# Patient Record
Sex: MALE | Race: WHITE | NOT HISPANIC OR LATINO | Employment: FULL TIME | URBAN - NONMETROPOLITAN AREA
[De-identification: names, ages, dates, MRNs, and addresses within clinical notes are randomized per-mention and may not be internally consistent; named-entity substitution may affect disease eponyms.]

---

## 2020-02-10 ENCOUNTER — OFFICE VISIT (OUTPATIENT)
Dept: FAMILY MEDICINE | Facility: OTHER | Age: 30
End: 2020-02-10
Attending: FAMILY MEDICINE
Payer: COMMERCIAL

## 2020-02-10 VITALS
SYSTOLIC BLOOD PRESSURE: 138 MMHG | TEMPERATURE: 98.1 F | DIASTOLIC BLOOD PRESSURE: 86 MMHG | BODY MASS INDEX: 31.29 KG/M2 | HEART RATE: 83 BPM | OXYGEN SATURATION: 97 % | WEIGHT: 231 LBS | HEIGHT: 72 IN

## 2020-02-10 DIAGNOSIS — F17.200 TOBACCO USE DISORDER: ICD-10-CM

## 2020-02-10 DIAGNOSIS — J32.9 CHRONIC CONGESTION OF PARANASAL SINUS: Primary | ICD-10-CM

## 2020-02-10 DIAGNOSIS — Z71.6 ENCOUNTER FOR TOBACCO USE CESSATION COUNSELING: ICD-10-CM

## 2020-02-10 PROCEDURE — 99213 OFFICE O/P EST LOW 20 MIN: CPT | Performed by: FAMILY MEDICINE

## 2020-02-10 RX ORDER — FLUTICASONE PROPIONATE 50 MCG
1 SPRAY, SUSPENSION (ML) NASAL DAILY
Qty: 16 G | Refills: 1 | Status: SHIPPED | OUTPATIENT
Start: 2020-02-10 | End: 2022-03-15

## 2020-02-10 ASSESSMENT — PAIN SCALES - GENERAL: PAINLEVEL: NO PAIN (1)

## 2020-02-10 ASSESSMENT — MIFFLIN-ST. JEOR: SCORE: 2040.81

## 2020-02-10 NOTE — PATIENT INSTRUCTIONS
Smoking cessation advised.  Saline sinus rinses - neti pot.  Restart nasal steroid spray - use daily - takes time to take effect.  CT sinuses ordered.  ENT referral.

## 2020-02-10 NOTE — NURSING NOTE
Chief Complaint   Patient presents with     Sinus Problem       Initial /86 (BP Location: Left arm, Patient Position: Sitting, Cuff Size: Adult Regular)   Pulse 83   Temp 98.1  F (36.7  C) (Tympanic)   Ht 1.829 m (6')   Wt 104.8 kg (231 lb)   SpO2 97%   BMI 31.33 kg/m   Estimated body mass index is 31.33 kg/m  as calculated from the following:    Height as of this encounter: 1.829 m (6').    Weight as of this encounter: 104.8 kg (231 lb).  Medication Reconciliation: complete  Agnes Lawson LPN

## 2020-02-10 NOTE — PROGRESS NOTES
Subjective       Sonido Stevens is a 29 year old male who presents to clinic today for the following health issues:    HPI   RESPIRATORY SYMPTOMS-sinuses      Duration: 5 years    Description  nasal congestion, rhinorrhea, facial pain/pressure and headache - mostly right sided    Severity: moderate    Accompanying signs and symptoms: right nostril constantly plugged    History (predisposing factors):  tobacco abuse 1/2 pack per day     Precipitating or alleviating factors: None    Therapies tried and outcome:  nasal spray/wash -noneffective- aleve D was working but not as effective anymore    No antibiotic use recently    Last seen here 2016    Aleve D OTC - takes most days    No ENT/sinus surgery    More right sided sinus infections    Does get dental pain right upper    No fevers    No ear symptoms    No ST or voice changes or post nasal drip    No wheeze    No childhood asthma/eczema/allergies    Uncle and grandpa with sinus surgery    Prior Waltin-D; month of nasonex; Afrin occasionally    No prior allergy testing    No prior sinus imaging    No seasonal component    Works outside    Prior saline rinses        Current Outpatient Medications   Medication     fluticasone (FLONASE) 50 MCG/ACT nasal spray     UNABLE TO FIND     No current facility-administered medications for this visit.        Patient Active Problem List   Diagnosis     ACP (advance care planning)     Past Surgical History:   Procedure Laterality Date     wisdom teeth  2014       Social History     Tobacco Use     Smoking status: Current Every Day Smoker     Packs/day: 0.50     Types: Cigarettes     Smokeless tobacco: Former User     Tobacco comment: info given on [facility] classes, declined call it quits   Substance Use Topics     Alcohol use: Yes     Alcohol/week: 0.0 standard drinks     Family History   Problem Relation Age of Onset     Diabetes No family hx of      Hypertension No family hx of            Reviewed and updated as needed this visit by  Provider  Tobacco  Allergies  Meds  Med Hx  Surg Hx  Fam Hx  Soc Hx        Review of Systems   ROS COMP: Constitutional, HEENT, cardiovascular, pulmonary, gi and gu systems are negative, except as otherwise noted.      Objective    /86 (BP Location: Left arm, Patient Position: Sitting, Cuff Size: Adult Regular)   Pulse 83   Temp 98.1  F (36.7  C) (Tympanic)   Ht 1.829 m (6')   Wt 104.8 kg (231 lb)   SpO2 97%   BMI 31.33 kg/m    Body mass index is 31.33 kg/m .  Physical Exam   GENERAL: healthy, alert and no distress  EYES: Eyes grossly normal to inspection, PERRL and conjunctivae and sclerae normal  HENT: right ear: normal: no effusions, no erythema, normal landmarks, left ear: clear effusion, nasal mucosa edematous , rhinorrhea yellow, green and most noted on the right, oropharynx clear, oral mucous membranes moist and sinuses: maxillary, frontal tenderness on right  NECK: no adenopathy, no asymmetry, masses, or scars and thyroid normal to palpation  RESP: lungs clear to auscultation - no rales, rhonchi or wheezes  CV: regular rate and rhythm, normal S1 S2, no S3 or S4, no murmur, click or rub, no peripheral edema and peripheral pulses strong  PSYCH: mentation appears normal, affect normal/bright    Diagnostic Test Results:  none         Assessment & Plan       ICD-10-CM    1. Chronic congestion of paranasal sinus J32.9 CT Sinus w/o Contrast     OTOLARYNGOLOGY REFERRAL     fluticasone (FLONASE) 50 MCG/ACT nasal spray   2. Tobacco use disorder F17.200    3. Encounter for tobacco use cessation counseling Z71.6         Tobacco Cessation:   reports that he has been smoking cigarettes. He has been smoking about 0.50 packs per day. He has quit using smokeless tobacco.  Tobacco Cessation Action Plan: Information offered: Patient not interested at this time      BMI:   Estimated body mass index is 31.33 kg/m  as calculated from the following:    Height as of this encounter: 1.829 m (6').    Weight as of  this encounter: 104.8 kg (231 lb).     Given duration, and unilateral symptoms, concern for a structural cause.  Has tried antihistamines, nasal sprays (not very long), decongestants.  Evaluate structure with CT.  ENT consult as well.  Discussed tobacco use as it related to chronic sinus symptoms, recurrent infections.    Patient Instructions   Smoking cessation advised.  Saline sinus rinses - neti pot.  Restart nasal steroid spray - use daily - takes time to take effect.  CT sinuses ordered.  ENT referral.        No follow-ups on file.    Africa Bass MD  [facility] [place]

## 2020-02-18 ENCOUNTER — HOSPITAL ENCOUNTER (OUTPATIENT)
Dept: CT IMAGING | Facility: HOSPITAL | Age: 30
Discharge: HOME | End: 2020-02-18
Attending: FAMILY MEDICINE | Admitting: FAMILY MEDICINE
Payer: COMMERCIAL

## 2020-02-18 DIAGNOSIS — J32.9 CHRONIC CONGESTION OF PARANASAL SINUS: ICD-10-CM

## 2020-02-18 PROCEDURE — 70486 CT MAXILLOFACIAL W/O DYE: CPT | Mod: TC

## 2020-03-02 ENCOUNTER — HEALTH MAINTENANCE LETTER (OUTPATIENT)
Age: 30
End: 2020-03-02

## 2020-03-19 ENCOUNTER — OFFICE VISIT (OUTPATIENT)
Dept: OTOLARYNGOLOGY | Facility: OTHER | Age: 30
End: 2020-03-19
Attending: PHYSICIAN ASSISTANT
Payer: COMMERCIAL

## 2020-03-19 VITALS — WEIGHT: 230 LBS | HEIGHT: 72 IN | BODY MASS INDEX: 31.15 KG/M2

## 2020-03-19 DIAGNOSIS — J32.4 CHRONIC PANSINUSITIS: Primary | ICD-10-CM

## 2020-03-19 DIAGNOSIS — J34.3 NASAL TURBINATE HYPERTROPHY: ICD-10-CM

## 2020-03-19 DIAGNOSIS — J34.2 DNS (DEVIATED NASAL SEPTUM): ICD-10-CM

## 2020-03-19 DIAGNOSIS — R09.81 CONGESTION OF PARANASAL SINUS: ICD-10-CM

## 2020-03-19 DIAGNOSIS — J31.0 CHRONIC RHINITIS: ICD-10-CM

## 2020-03-19 PROCEDURE — 99213 OFFICE O/P EST LOW 20 MIN: CPT | Mod: 95 | Performed by: PHYSICIAN ASSISTANT

## 2020-03-19 RX ORDER — FLUTICASONE PROPIONATE 50 MCG
2 SPRAY, SUSPENSION (ML) NASAL DAILY
Qty: 16 G | Refills: 11 | Status: SHIPPED | OUTPATIENT
Start: 2020-03-19 | End: 2022-03-15

## 2020-03-19 RX ORDER — BUDESONIDE 0.5 MG/2ML
0.5 INHALANT ORAL 2 TIMES DAILY
Qty: 2 BOX | Refills: 1 | Status: SHIPPED | OUTPATIENT
Start: 2020-03-19 | End: 2022-03-15

## 2020-03-19 RX ORDER — CETIRIZINE HYDROCHLORIDE, PSEUDOEPHEDRINE HYDROCHLORIDE 5; 120 MG/1; MG/1
1 TABLET, FILM COATED, EXTENDED RELEASE ORAL 2 TIMES DAILY
Qty: 60 TABLET | Refills: 3 | Status: SHIPPED | OUTPATIENT
Start: 2020-03-19 | End: 2022-03-15

## 2020-03-19 ASSESSMENT — MIFFLIN-ST. JEOR: SCORE: 2036.27

## 2020-03-19 ASSESSMENT — PAIN SCALES - GENERAL: PAINLEVEL: NO PAIN (0)

## 2020-03-19 NOTE — PROGRESS NOTES
"Sonido Stevens is a 29 year old male who is being evaluated via a billable telephone visit.      The patient has been notified of following:     \"This telephone visit will be conducted via a call between you and your physician/provider. We have found that certain health care needs can be provided without the need for a physical exam.  This service lets us provide the care you need with a short phone conversation.  If a prescription is necessary we can send it directly to your pharmacy.  If lab work is needed we can place an order for that and you can then stop by our lab to have the test done at a later time.    If during the course of the call the physician/provider feels a telephone visit is not appropriate, you will not be charged for this service.\"     Sonido Stevens complains of    Chief Complaint   Patient presents with     Sinus Problem     Pt has been referred by Dr. Ritter for chronic congestion of the paranasal sinus.  Pt had a CT sinus.     Patient has noticed ongoing concerns for the last 5 years, progressively worsening throughout the years. He has been using Aleve D w/o much relief. He was recently seen by his Primary physician, Dr Ritter due to ongoing issues. Chronic facial issues, right nasal obstruction. He was started on Flonase and ordered a Sinus CT.   No recent abx use.     He has neti pot, but not used on a regular basis.   He has ongoing issues on right maxillary, ethmoid. Frontal pain.   He has right upper jaw pain as well.   He has some allergy symptoms but has not felt symptoms.   He has Flonase and using with some relief.   He does feel restricted on his right side.     Allergy:  Resides in an apartments.   There is no water or mold  Carpet in bedroom - Yes   Heat in home- Baseboard heat  Animals- one cat   Family hx of allergies- Uncle- sinus issues.   Past trials of medications Decongestants and one month of Flonase.   Denies COM. No otologic surgeries.   No prior nasal trauma or " surgery.     Reviewed Sinus CT    EXAM:CT SINUS W/O CONTRAST      CLINICAL HISTORY: Patient Age  29 years Additional clinical info:  Sinusitis, chronic, possible surgery; Chronic congestion of paranasal  sinus     COMPARISON: None       TECHNIQUE: Axial images acquired without contrast with coronal and  sagittal two-dimensional reformatted images     CONTRAST: None     FINDINGS: Near complete opacification of the bilateral maxillary  sinuses and ethmoid sinuses. Sphenoid and frontal sinuses are  completely opacified and filled with fluid or soft tissue. There are a  few pockets of gas ethmoid sinuses and in the center of the right  maxillary antrum. Bone thickening about the sphenoid sinuses  consistent with chronic inflammatory change. Areas of bone erosion of  the ethmoid sinuses Bilateral infundibula are obstructed. Nasal septum  is slightly deviated to the left anteriorly. The mastoid air cells  appear normal                                                                           IMPRESSION:   1. Severe diffuse paranasal sinusitis with near complete opacification  of all of the paranasal sinuses and chronic appearing bone thickening  surrounding the sphenoid sinuses.  2. Slight deviation of the anterior nasal septum to the left.     I have reviewed and updated the patient's Past Medical History, Social History, Family History and Medication List.    ALLERGIES  Patient has no known allergies.    Additional provider notes: Reviewed above and Sinus CT imaging was reviewed as noted above.       Assessment/Plan:  1. Chronic pansinusitis  2. DNS (deviated nasal septum)  3. Nasal turbinate hypertrophy  4. Congestion of paranasal sinus  5. Chronic rhinitis    - budesonide (PULMICORT) 0.5 MG/2ML neb solution; Spray 2 mLs (0.5 mg) in nostril 2 times daily Make 240 cc Francesco med sinus irrigation Mix 2 ml vial of budesonide 0.5 mg Rinse BID for 4 weeks  Dispense: 2 Box; Refill: 1  - fluticasone (FLONASE) 50 MCG/ACT nasal  spray; Spray 2 sprays into both nostrils daily  Dispense: 16 g; Refill: 11  - amoxicillin-clavulanate (AUGMENTIN) 875-125 MG tablet; Take 1 tablet by mouth 2 times daily for 10 days  Dispense: 20 tablet; Refill: 0  - cetirizine-pseudoePHEDrine ER (ZYRTEC-D) 5-120 MG 12 hr tablet; Take 1 tablet by mouth 2 times daily  Dispense: 60 tablet; Refill: 3    Patient will start Budesonide BID for 4+ weeks  Continue with Flonase at this time.   Augmentin for 10 days sent in. Start oral probiotic.   Zyrtec D 1-2 times daily or PRN use.     Reviewed imaging and ongoing issues, will return to ENT for repeat exam with rigid endoscopy. Discussed options of completing sinus surgery with patient.   Follow up arranged with Dr. Evans to complete above.   He was informed regarding possible allergy testing, however, his symptoms are year round without significant seasonal complaints.     He agrees with this plan.         Budesonide nasal saline irrigation per instructions:  -Obtain Francesco Med Sinus rinse over the counter.    -Use warm distilled water and 2 packets of the salt solution that comes with the bottle, dissolve in bottle up to the 240 mL jaylon.  -Add 1 vial of budesonide.  -Irrigate each side of your nose leaning over the sink, using 1/3 to 1/2 the volume of the bottle in each nostril every irrigation.  Irrigate 2 times daily.  -If additional rinses are needed/recommended, you may use the plan Francesco Med Sinus irrigation without the use of added budesonide    Phone call duration: 20 minutes    Dominique Drake PA-C  ENT  [facility], [place]  [phone]

## 2020-03-19 NOTE — PATIENT INSTRUCTIONS
Start Budesonide sinus rinses.   Rinse as directed twice a day, until follow up.   Continue with Flonase daily.   Start Zyrtec D. Use 1-2 times daily or as needed  Start Augmentin twice a day for 10 days. Take probiotic with.     Follow up with Dr. Evans for nasal exam and consideration of sinus surgery.       Thank you for allowing Dominique Drake PA-C and our ENT team to participate in your care.  If your medications are too expensive, please give the nurse a call.  We can possibly change this medication.  If you have a scheduling or an appointment question please contact our Health Unit Coordinator at their direct line [phone].   ALL nursing questions or concerns can be directed to your ENT nurse at: [phone] [place]    Budesonide nasal saline irrigation per instructions:  -Obtain Francesco Med Sinus rinse over the counter.    -Use warm distilled water and 2 packets of the salt solution that comes with the bottle, dissolve in bottle up to the 240 mL jaylon.  -Add 1 vial of budesonide.  -Irrigate each side of your nose leaning over the sink, using 1/3 to 1/2 the volume of the bottle in each nostril every irrigation.  Irrigate 2 times daily.  -If additional rinses are needed/recommended, you may use the plan Francesco Med Sinus irrigation without the use of added budesonide

## 2020-03-19 NOTE — LETTER
"    3/19/2020         RE: Sonido Stevens  [address]  [place] [address]        Dear Colleague,    Thank you for referring your patient, Sonido Stevens, to the [facility]. Please see a copy of my visit note below.    Sonido Stevens is a 29 year old male who is being evaluated via a billable telephone visit.      The patient has been notified of following:     \"This telephone visit will be conducted via a call between you and your physician/provider. We have found that certain health care needs can be provided without the need for a physical exam.  This service lets us provide the care you need with a short phone conversation.  If a prescription is necessary we can send it directly to your pharmacy.  If lab work is needed we can place an order for that and you can then stop by our lab to have the test done at a later time.    If during the course of the call the physician/provider feels a telephone visit is not appropriate, you will not be charged for this service.\"     Sonido Stevens complains of    Chief Complaint   Patient presents with     Sinus Problem     Pt has been referred by Dr. Ritter for chronic congestion of the paranasal sinus.  Pt had a CT sinus.     Patient has noticed ongoing concerns for the last 5 years, progressively worsening throughout the years. He has been using Aleve D w/o much relief. He was recently seen by his Primary physician, Dr Ritter due to ongoing issues. Chronic facial issues, right nasal obstruction. He was started on Flonase and ordered a Sinus CT.   No recent abx use.     He has neti pot, but not used on a regular basis.   He has ongoing issues on right maxillary, ethmoid. Frontal pain.   He has right upper jaw pain as well.   He has some allergy symptoms but has not felt symptoms.   He has Flonase and using with some relief.   He does feel restricted on his right side.     Allergy:  Resides in an apartments.   There is no water or mold  Carpet in " bedroom - Yes   Heat in home- Baseboard heat  Animals- one cat   Family hx of allergies- Uncle- sinus issues.   Past trials of medications Decongestants and one month of Flonase.   Denies COM. No otologic surgeries.   No prior nasal trauma or surgery.     Reviewed Sinus CT    EXAM:CT SINUS W/O CONTRAST      CLINICAL HISTORY: Patient Age  29 years Additional clinical info:  Sinusitis, chronic, possible surgery; Chronic congestion of paranasal  sinus     COMPARISON: None       TECHNIQUE: Axial images acquired without contrast with coronal and  sagittal two-dimensional reformatted images     CONTRAST: None     FINDINGS: Near complete opacification of the bilateral maxillary  sinuses and ethmoid sinuses. Sphenoid and frontal sinuses are  completely opacified and filled with fluid or soft tissue. There are a  few pockets of gas ethmoid sinuses and in the center of the right  maxillary antrum. Bone thickening about the sphenoid sinuses  consistent with chronic inflammatory change. Areas of bone erosion of  the ethmoid sinuses Bilateral infundibula are obstructed. Nasal septum  is slightly deviated to the left anteriorly. The mastoid air cells  appear normal                                                                           IMPRESSION:   1. Severe diffuse paranasal sinusitis with near complete opacification  of all of the paranasal sinuses and chronic appearing bone thickening  surrounding the sphenoid sinuses.  2. Slight deviation of the anterior nasal septum to the left.     I have reviewed and updated the patient's Past Medical History, Social History, Family History and Medication List.    ALLERGIES  Patient has no known allergies.    Additional provider notes: Reviewed above and Sinus CT imaging was reviewed as noted above.       Assessment/Plan:  1. Chronic pansinusitis  2. DNS (deviated nasal septum)  3. Nasal turbinate hypertrophy  4. Congestion of paranasal sinus  5. Chronic rhinitis    - budesonide  (PULMICORT) 0.5 MG/2ML neb solution; Spray 2 mLs (0.5 mg) in nostril 2 times daily Make 240 cc Francesco med sinus irrigation Mix 2 ml vial of budesonide 0.5 mg Rinse BID for 4 weeks  Dispense: 2 Box; Refill: 1  - fluticasone (FLONASE) 50 MCG/ACT nasal spray; Spray 2 sprays into both nostrils daily  Dispense: 16 g; Refill: 11  - amoxicillin-clavulanate (AUGMENTIN) 875-125 MG tablet; Take 1 tablet by mouth 2 times daily for 10 days  Dispense: 20 tablet; Refill: 0  - cetirizine-pseudoePHEDrine ER (ZYRTEC-D) 5-120 MG 12 hr tablet; Take 1 tablet by mouth 2 times daily  Dispense: 60 tablet; Refill: 3    Patient will start Budesonide BID for 4+ weeks  Continue with Flonase at this time.   Augmentin for 10 days sent in. Start oral probiotic.   Zyrtec D 1-2 times daily or PRN use.     Reviewed imaging and ongoing issues, will return to ENT for repeat exam with rigid endoscopy. Discussed options of completing sinus surgery with patient.   Follow up arranged with Dr. Evans to complete above.   He was informed regarding possible allergy testing, however, his symptoms are year round without significant seasonal complaints.     He agrees with this plan.         Budesonide nasal saline irrigation per instructions:  -Obtain Francesco Med Sinus rinse over the counter.    -Use warm distilled water and 2 packets of the salt solution that comes with the bottle, dissolve in bottle up to the 240 mL jaylon.  -Add 1 vial of budesonide.  -Irrigate each side of your nose leaning over the sink, using 1/3 to 1/2 the volume of the bottle in each nostril every irrigation.  Irrigate 2 times daily.  -If additional rinses are needed/recommended, you may use the plan Francesco Med Sinus irrigation without the use of added budesonide    Phone call duration: 20 minutes    Dominique Drake PA-C  ENT  [facility], [place]  [phone]      Again, thank you for allowing me to participate in the care of your patient.        Sincerely,        Dominique Drake PA-C

## 2020-08-05 ENCOUNTER — OFFICE VISIT (OUTPATIENT)
Dept: FAMILY MEDICINE | Facility: OTHER | Age: 30
End: 2020-08-05
Attending: FAMILY MEDICINE
Payer: COMMERCIAL

## 2020-08-05 DIAGNOSIS — R51.9 HEADACHE: Primary | ICD-10-CM

## 2020-08-05 DIAGNOSIS — R52 BODY ACHES: ICD-10-CM

## 2020-08-05 DIAGNOSIS — R53.83 FATIGUE: ICD-10-CM

## 2020-08-05 PROCEDURE — U0003 INFECTIOUS AGENT DETECTION BY NUCLEIC ACID (DNA OR RNA); SEVERE ACUTE RESPIRATORY SYNDROME CORONAVIRUS 2 (SARS-COV-2) (CORONAVIRUS DISEASE [COVID-19]), AMPLIFIED PROBE TECHNIQUE, MAKING USE OF HIGH THROUGHPUT TECHNOLOGIES AS DESCRIBED BY CMS-2020-01-R: HCPCS | Performed by: FAMILY MEDICINE

## 2020-08-06 LAB
SARS-COV-2 RNA SPEC QL NAA+PROBE: NOT DETECTED
SPECIMEN SOURCE: NORMAL

## 2020-12-20 ENCOUNTER — HEALTH MAINTENANCE LETTER (OUTPATIENT)
Age: 30
End: 2020-12-20

## 2021-04-18 ENCOUNTER — HEALTH MAINTENANCE LETTER (OUTPATIENT)
Age: 31
End: 2021-04-18

## 2021-08-17 ENCOUNTER — IMMUNIZATION (OUTPATIENT)
Dept: FAMILY MEDICINE | Facility: OTHER | Age: 31
End: 2021-08-17
Attending: FAMILY MEDICINE
Payer: COMMERCIAL

## 2021-08-17 PROCEDURE — 0001A PR COVID VAC PFIZER DIL RECON 30 MCG/0.3 ML IM: CPT

## 2021-08-17 PROCEDURE — 91300 PR COVID VAC PFIZER DIL RECON 30 MCG/0.3 ML IM: CPT

## 2021-09-08 ENCOUNTER — IMMUNIZATION (OUTPATIENT)
Dept: FAMILY MEDICINE | Facility: OTHER | Age: 31
End: 2021-09-08
Attending: FAMILY MEDICINE
Payer: COMMERCIAL

## 2021-09-08 PROCEDURE — 0002A PR COVID VAC PFIZER DIL RECON 30 MCG/0.3 ML IM: CPT

## 2021-09-08 PROCEDURE — 91300 PR COVID VAC PFIZER DIL RECON 30 MCG/0.3 ML IM: CPT

## 2021-10-03 ENCOUNTER — HEALTH MAINTENANCE LETTER (OUTPATIENT)
Age: 31
End: 2021-10-03

## 2022-01-20 ENCOUNTER — LAB (OUTPATIENT)
Dept: OCCUPATIONAL MEDICINE | Facility: OTHER | Age: 32
End: 2022-01-20

## 2022-01-20 PROCEDURE — C9803 HOPD COVID-19 SPEC COLLECT: HCPCS

## 2022-03-01 ENCOUNTER — ANCILLARY PROCEDURE (OUTPATIENT)
Dept: GENERAL RADIOLOGY | Facility: OTHER | Age: 32
End: 2022-03-01
Attending: NURSE PRACTITIONER
Payer: COMMERCIAL

## 2022-03-01 ENCOUNTER — OFFICE VISIT (OUTPATIENT)
Dept: FAMILY MEDICINE | Facility: OTHER | Age: 32
End: 2022-03-01
Attending: NURSE PRACTITIONER
Payer: COMMERCIAL

## 2022-03-01 VITALS
HEART RATE: 120 BPM | DIASTOLIC BLOOD PRESSURE: 88 MMHG | TEMPERATURE: 97.6 F | OXYGEN SATURATION: 97 % | SYSTOLIC BLOOD PRESSURE: 138 MMHG

## 2022-03-01 DIAGNOSIS — U09.9 POST-COVID CHRONIC COUGH: ICD-10-CM

## 2022-03-01 DIAGNOSIS — R05.3 POST-COVID CHRONIC COUGH: Primary | ICD-10-CM

## 2022-03-01 DIAGNOSIS — R05.3 POST-COVID CHRONIC COUGH: ICD-10-CM

## 2022-03-01 DIAGNOSIS — U09.9 POST-COVID CHRONIC COUGH: Primary | ICD-10-CM

## 2022-03-01 PROCEDURE — 99213 OFFICE O/P EST LOW 20 MIN: CPT | Performed by: NURSE PRACTITIONER

## 2022-03-01 PROCEDURE — 71046 X-RAY EXAM CHEST 2 VIEWS: CPT | Mod: TC | Performed by: RADIOLOGY

## 2022-03-01 RX ORDER — BENZONATATE 100 MG/1
100 CAPSULE ORAL 3 TIMES DAILY PRN
Qty: 20 CAPSULE | Refills: 0 | Status: SHIPPED | OUTPATIENT
Start: 2022-03-01 | End: 2022-03-15

## 2022-03-01 RX ORDER — ALBUTEROL SULFATE 90 UG/1
2 AEROSOL, METERED RESPIRATORY (INHALATION) EVERY 6 HOURS
Qty: 18 G | Refills: 0 | Status: SHIPPED | OUTPATIENT
Start: 2022-03-01

## 2022-03-01 ASSESSMENT — PAIN SCALES - GENERAL: PAINLEVEL: NO PAIN (0)

## 2022-03-01 NOTE — PATIENT INSTRUCTIONS
Patient Education     Viral Upper Respiratory Illness with Wheezing (Adult)    You have a viral upper respiratory illness (URI), which is another term for the common cold. When the infection causes a lot of irritation, the air passages can go into spasm. This causes wheezing and shortness of breath.  This illness is contagious during the first few days. It is spread through the air by coughing and sneezing. It may also be spread by direct contact. This could be by touching the sick person and then touching your own eyes, nose, or mouth. Frequent handwashing will decrease the risk.  Most viral illnesses go away within 7 to 10 days with rest and simple home remedies. Sometimes the illness may last for several weeks. Antibiotics will not kill a virus, and they are generally not prescribed for this condition.  Home care    If symptoms are severe, rest at home for the first 2 to 3 days. When you resume activity, don't let yourself get too tired.    If you smoke, stop. Ask your healthcare provider if you need help.    Stay away from secondhand cigarette smoke. Don't let people smoke in your house or car.    You may use acetaminophen or ibuprofen to control pain and fever, unless another medicine was prescribed. Take the medicine only as directed on the label. If you have chronic liver or kidney disease, have ever had a stomach ulcer or gastrointestinal bleeding, or are taking blood-thinning medicines, talk with your healthcare provider before using these medicines. Aspirin should never be given to anyone under 18 years of age who is ill with a viral infection or fever. It may cause severe liver or brain damage.    Your appetite may be poor, so a light diet is fine. Stay well hydrated by drinking 6 to 8 glasses of fluids per day (water, soft drinks, juices, tea, or soup). Extra fluids will help loosen secretions in the nose and lungs.    Over-the-counter cold medicines will not shorten the length of time you re sick, but  they may be helpful for the following symptoms: cough, sore throat, and nasal and sinus congestion. Ask your healthcare provider or pharmacist which over-the-counter medicine to use. Don't use decongestants if you have high blood pressure.  Follow-up care  Follow up with your healthcare provider, or as advised.  When to seek medical advice  Call your healthcare provider right away if any of these occur:    Cough with lots of colored sputum (mucus)    Severe headache; face, neck, or ear pain    Difficulty swallowing due to throat pain    Fever of 100.4 F (38 C) or higher, or as directed by your healthcare provider  Call 911  Call 911 if any of these occur:    Chest pain, shortness of breath, worsening wheezing, or difficulty breathing    Coughing up blood    Very severe pain when swallowing, especially if it goes along with a muffled voice  [employer] last reviewed this educational content on 6/1/2018 2000-2021 [employer]. All rights reserved. This information is not intended as a substitute for professional medical care. Always follow your healthcare professional's instructions.

## 2022-03-01 NOTE — PROGRESS NOTES
Assessment & Plan     Post-COVID chronic cough  Wheezing and harsh dry cough   Reviewed XR no signs of post covid pneumonia  Discussed self care for cough control with hydration and honey  - XR CHEST 2 VW (Clinic Performed); Future  - albuterol (PROAIR HFA/PROVENTIL HFA/VENTOLIN HFA) 108 (90 Base) MCG/ACT inhaler; Inhale 2 puffs into the lungs every 6 hours  - benzonatate (TESSALON) 100 MG capsule; Take 1 capsule (100 mg) by mouth 3 times daily as needed for cough             Tobacco Cessation:   reports that he has been smoking cigarettes. He has been smoking about 0.50 packs per day. He has quit using smokeless tobacco.      See Patient Instructions    No follow-ups on file.    DORA Nina [facility] - GONZALEZ Head is a 31 year old who presents for the following health issues     HPI     Acute Illness  Acute illness concerns: cough  Onset/Duration: 1 month  Symptoms:  Fever: no  Chills/Sweats: no  Headache (location?): no  Sinus Pressure: yes  Conjunctivitis:  no  Ear Pain: no  Rhinorrhea: YES  Congestion: no  Sore Throat: no  Cough: YES-non-productive  Wheeze: no  Decreased Appetite: no  Nausea: no  Vomiting: no  Diarrhea: no  Dysuria/Freq.: no  Dysuria or Hematuria: no  Fatigue/Achiness: no  Sick/Strep Exposure: no  Therapies tried and outcome: None  Work tested - positive for COVID about a month ago.  Continues to have cough.    Robitussin, day quil and mucinex - night time is the worse for the cough       Review of Systems   CONSTITUTIONAL: NEGATIVE for fever, chills, change in weight  ENT/MOUTH: sinus congestion   RESP:chronic cough dry cough most of the time with some chest tightness  CV: NEGATIVE for chest pain, palpitations or peripheral edema  GI: NEGATIVE for nausea, abdominal pain, heartburn, or change in bowel habits  : negative for dysuria, hematuria, decreased urinary stream, erectile dysfunction  NEURO: headache from cough       Objective    BP  138/88   Pulse 120   Temp 97.6  F (36.4  C) (Tympanic)   SpO2 97%   There is no height or weight on file to calculate BMI.  Physical Exam   GENERAL: alert and no distress  NECK: no adenopathy, no asymmetry, masses, or scars and thyroid normal to palpation  RESP: wheezing throughout and harsh dry cough   CV: regular rate and rhythm, normal S1 S2, no S3 or S4, no murmur, click or rub, no peripheral edema and peripheral pulses strong  ABDOMEN: soft, nontender, no hepatosplenomegaly, no masses and bowel sounds normal  SKIN: no suspicious lesions or rashes  NEURO: Normal strength and tone, mentation intact and speech normal  PSYCH: mentation appears normal, affect normal/bright    Results for orders placed or performed in visit on 03/01/22   XR CHEST 2 VW (Clinic Performed)     Status: None    Narrative    PROCEDURE:  XR CHEST 2 VW    HISTORY:  Post-COVID chronic cough; Post-COVID chronic cough.     COMPARISON:  None.    FINDINGS:   The cardiac silhouette is normal in size. The pulmonary vasculature is  normal.  The lungs are clear. No pleural effusion or pneumothorax.      Impression    IMPRESSION:  No acute cardiopulmonary disease.      RAFAL CORDON MD         SYSTEM ID:  [id number]

## 2022-03-07 NOTE — PROGRESS NOTES
Assessment & Plan     Acute bronchitis with symptoms > 10 days  Continued and worsening symptoms since seen over a week ago.  Long discussion about post covid vs bacterial infection.  At this time with worsening symptoms plan to treat with steroid and antibiotic.  He can use Robitussin AC at night to calm cough.  He is not sleeping well due to cough.  Tessalon pearls did not help with cough.  He has tried multiple symptomatic treatments with worsening symptoms.  Discussed plan of care. If no improvement he should follow up later Friday or next Monday for chest XR and labs or if worsening symptoms to the ER, such as SOB, difficulty breathing or any concerns   Previous chest XR on 3/1/22 was normal   - predniSONE (DELTASONE) 20 MG tablet; Take 1 tablet (20 mg) by mouth 2 times daily for 5 days  - azithromycin (ZITHROMAX) 250 MG tablet; Take 2 tablets (500 mg) by mouth daily for 1 day, THEN 1 tablet (250 mg) daily for 4 days.  - guaiFENesin-codeine (ROBITUSSIN AC) 100-10 MG/5ML solution; Take 5-10 mLs by mouth every 4 hours as needed for cough       Tobacco Cessation:   reports that he has been smoking cigarettes. He has been smoking about 0.50 packs per day. He has quit using smokeless tobacco.      See Patient Instructions    No follow-ups on file.    DORA Nina [facility] - GONZALEZ Head is a 31 year old who presents for the following health issues {    HPI     Acute Illness  Acute illness concerns: chronic cough  Onset/Duration: over a month  Symptoms:  Fever: no  Chills/Sweats: no  Headache (location?): no  Sinus Pressure: YES  Conjunctivitis:  no  Ear Pain: YES: bilateral  Rhinorrhea: no  Congestion: no  Sore Throat: YES  Cough: YES  Wheeze: no  Decreased Appetite: YES  Nausea: YES  Vomiting: YES  Diarrhea: no  Dysuria/Freq.: no  Dysuria or Hematuria: no  Fatigue/Achiness: no  Sick/Strep Exposure: no  Therapies tried and outcome: shira barker was seen  in clinic about a week ago for post covid cough.  He has had this cough for over a month now.  Symptomatic treatment without any improvement. Cough continues and now is getting a horse voice.  Normal CXR on 3/1/22.  Inhaler and Tessalon attempted without any improvement.          Review of Systems   CONSTITUTIONAL: NEGATIVE for fever, chills, change in weight  INTEGUMENTARY/SKIN: NEGATIVE for worrisome rashes, moles or lesions  ENT/MOUTH: ear pain bilateral, hoarseness and sore throat  RESP:coungested cough   CV: NEGATIVE for chest pain, palpitations or peripheral edema  GI: upper abdomen pain when coughing   : negative for dysuria, hematuria, decreased urinary stream, erectile dysfunction  NEURO: NEGATIVE for weakness, dizziness or paresthesias      Objective    /88 (BP Location: Right arm)   Pulse 87   Temp 97.7  F (36.5  C) (Tympanic)   Wt 113.4 kg (250 lb)   SpO2 96%   BMI 33.91 kg/m    Body mass index is 33.91 kg/m .  Physical Exam   GENERAL: alert and fatigued  HENT: ear canals and TM's normal, nose and mouth without ulcers or lesions  NECK: no adenopathy, no asymmetry, masses, or scars and thyroid normal to palpation  RESP: expiratory wheezes throughout  CV: regular rate and rhythm, normal S1 S2, no S3 or S4, no murmur, click or rub, no peripheral edema and peripheral pulses strong  ABDOMEN: soft, nontender, no hepatosplenomegaly, no masses and bowel sounds normal  NEURO: sensory exam grossly normal, mentation intact and fatigued     Office Visit on 08/05/2020   Component Date Value Ref Range Status     COVID-19 Virus PCR to [facility] - So* 08/05/2020 Nasopharyngeal   Final     COVID-19 Virus PCR to [facility] - Re* 08/05/2020 Not Detected   Final    Comment: Collection of multiple specimens from the same patient may be necessary to   detect the virus. The possibility of a false negative should be considered if   the patient's recent exposure or clinical presentation suggests 2019 nCOV   infection and  diagnostic tests for other causes of illness are negative.   Repeat testing may be considered in this setting.  Viral RNA was extracted via a validated method and subsequently underwent   single step reverse transcriptase-real time polymerase chain reaction using   primers to the CDC specified N1,N2 gene targets of CoV2 and human RNP as an   internal control.  A negative result does not rule out the presence of real-time PCR inhibitors   in the specimen or COVID-19 RNA in concentrations below the limit of detection   of the assay. The possibility of a false negative should be considered if the   patients recent exposure or clinical presentation suggests COVID-19.   Additional testing or repeat testing requires consultation with the   laborator                           y.  Nasopharyngeal specimen is the preferred choice for swab-based SARS CoV2   testing. When collection of a nasopharyngeal swab is not possible the   following are acceptable alternatives:  an oropharyngeal (OP) specimen collected by a healthcare professional, or a   nasal mid-turbinate (NMT) swab collected by a healthcare professional or by   onsite self-collection (using a flocked tapered swab), or an anterior nares   specimen collected by a healthcare professional or by onsite self-collection   (using a round foam swab). (Centers for Disease Control)  Testing performed by [facility], Room [address], [address] [place] [address]. This test was developed and its   performance characteristics determined by the [facility]. It has not been cleared or approved by the FDA.  The laboratory is regulated under the Clinical Laboratory Improvement   Amendments of 1988 (CLIA-88) as qualified to perform high-complexity testin                           g.   This test is used for clinical purposes. It should not be regarded as   investigational or for research.

## 2022-03-08 ENCOUNTER — OFFICE VISIT (OUTPATIENT)
Dept: FAMILY MEDICINE | Facility: OTHER | Age: 32
End: 2022-03-08
Attending: NURSE PRACTITIONER
Payer: COMMERCIAL

## 2022-03-08 VITALS
OXYGEN SATURATION: 96 % | TEMPERATURE: 97.7 F | WEIGHT: 250 LBS | HEART RATE: 87 BPM | BODY MASS INDEX: 33.91 KG/M2 | SYSTOLIC BLOOD PRESSURE: 132 MMHG | DIASTOLIC BLOOD PRESSURE: 88 MMHG

## 2022-03-08 DIAGNOSIS — J20.9 ACUTE BRONCHITIS WITH SYMPTOMS > 10 DAYS: Primary | ICD-10-CM

## 2022-03-08 PROCEDURE — 99214 OFFICE O/P EST MOD 30 MIN: CPT | Performed by: NURSE PRACTITIONER

## 2022-03-08 RX ORDER — AZITHROMYCIN 250 MG/1
TABLET, FILM COATED ORAL
Qty: 6 TABLET | Refills: 0 | Status: SHIPPED | OUTPATIENT
Start: 2022-03-08 | End: 2022-03-15

## 2022-03-08 RX ORDER — CODEINE PHOSPHATE AND GUAIFENESIN 10; 100 MG/5ML; MG/5ML
1-2 SOLUTION ORAL EVERY 4 HOURS PRN
Qty: 118 ML | Refills: 0 | Status: SHIPPED | OUTPATIENT
Start: 2022-03-08 | End: 2022-03-15

## 2022-03-08 RX ORDER — PREDNISONE 20 MG/1
20 TABLET ORAL 2 TIMES DAILY
Qty: 10 TABLET | Refills: 0 | Status: SHIPPED | OUTPATIENT
Start: 2022-03-08 | End: 2022-03-15

## 2022-03-08 ASSESSMENT — PAIN SCALES - GENERAL: PAINLEVEL: MILD PAIN (3)

## 2022-03-08 NOTE — LETTER
March 8, 2022      Sonido Stevens  [address] [place] [address]        To Whom It May Concern:    Sonido Stevens  was seen on 3/8/2022.  Please excuse him for the next 2-3 days due to illness.        Sincerely,        Honey Curtis, DORA CNP

## 2022-03-08 NOTE — NURSING NOTE
Chief Complaint   Patient presents with     Cough       Initial BP (!) 138/100 (BP Location: Right arm, Patient Position: Chair, Cuff Size: Adult Large)   Pulse 87   Temp 97.7  F (36.5  C) (Tympanic)   Wt 113.4 kg (250 lb)   SpO2 96%   BMI 33.91 kg/m   Estimated body mass index is 33.91 kg/m  as calculated from the following:    Height as of 3/19/20: 1.829 m (6').    Weight as of this encounter: 113.4 kg (250 lb).  Medication Reconciliation: complete  Man Ochoa LPN

## 2022-03-08 NOTE — PATIENT INSTRUCTIONS

## 2022-03-14 NOTE — PROGRESS NOTES
Assessment & Plan     Post-COVID chronic cough  Continues to have severe cough since COVID.  He did have relief while taking prednisone.  Plan to try another taper.  He does have a history of smoking will add PFTs and referral to pulmonology for further evaluation and treatment.   Chest XR remains clear for signs of pneumonia   Reviewed labs - normal kidney and liver function  CBC elevated WBC and neutrophils - recent steroid use  Elevated eosinophils - should take a daily antihistamine   - XR CHEST 2 VW (Clinic Performed); Future  - CBC with platelets and differential; Future  - Comprehensive metabolic panel (BMP + Alb, Alk Phos, ALT, AST, Total. Bili, TP); Future  - CBC with platelets and differential  - Comprehensive metabolic panel (BMP + Alb, Alk Phos, ALT, AST, Total. Bili, TP)  - predniSONE (DELTASONE) 10 MG tablet; Take 3 tabs by mouth daily x 3 days, then 2 tabs daily x 3 days, then 1 tab daily x 3 days, then 1/2 tab daily x 3 days.  - Pulmonary Function Test; Future  - Adult Pulmonary Medicine Referral  - guaiFENesin-codeine (ROBITUSSIN AC) 100-10 MG/5ML solution; Take 5-10 mLs by mouth every 4 hours as needed for cough      I spent a total of 55 minutes on the day of the visit.   Time spent doing chart review, history and exam, documentation and further activities per the note       Tobacco Cessation:   reports that he has been smoking cigarettes. He has been smoking about 0.50 packs per day. He has quit using smokeless tobacco.  Encouraged     See Patient Instructions    No follow-ups on file.    DORA Nina [facility] - [place]ZALE[place] Head is a 31 year old who presents for the following health issues  accompanied by his alone.    HPI       Concern - follow up cough  Onset: January 2022  Description: cough  Intensity: severe  Progression of Symptoms:  worsening  Accompanying Signs & Symptoms: chest pain from coughing  Previous history of similar problem: since  january  Precipitating factors:        Worsened by: nothing  Alleviating factors:        Improved by: prednisone  Therapies tried and outcome: antibiotic and prednisone.  Came back 48 hours after treatment  Post COVID cough positive for COVID on 1/20/22  Tried tessalon and albuterol inhaler no improvement on 3/1/22 normal chest XR   He is using the albuterol inhaler every 3-6 hours maybe some relief not sure   Follow up again on 3/8/22 with worsening symptoms treatment for bronchitis with prednisone, azithromycin  And robitussin with codeine - symptoms came back 2 days after completing prednisone   Prednisone did help short term, but symptoms worse again after completed prednisone   Had to sleep sitting up again, coughing until he vomits at times  PFT- has not had done previously no previous lung disease smoker used to smoke 5-6 per day now not able too   Consider pulmonology or post covid clinic     Review of Systems   CONSTITUTIONAL: NEGATIVE for fever, chills, change in weight  INTEGUMENTARY/SKIN: NEGATIVE for worrisome rashes, moles or lesions  ENT/MOUTH: NEGATIVE for ear, mouth and throat problems  RESP:cough, chest pain/tightness across into back  CV: chest pain from coughing   GI: NEGATIVE for nausea, abdominal pain, heartburn, or change in bowel habits  : negative for dysuria, hematuria, decreased urinary stream, erectile dysfunction  MUSCULOSKELETAL: NEGATIVE for significant arthralgias or myalgia  NEURO: continue to be fatigued again since stopping prednisone       Objective    /88   Pulse 108   Temp 98  F (36.7  C) (Tympanic)   SpO2 97%   There is no height or weight on file to calculate BMI.  Physical Exam   GENERAL: alert and mild distress SOB after coughing and oxygen sats decrease to 92% after cough   RESP: decreased breath sounds throughout  CV: regular rate and rhythm, normal S1 S2, no S3 or S4, no murmur, click or rub, no peripheral edema and peripheral pulses strong  ABDOMEN: soft,  nontender, no hepatosplenomegaly, no masses and bowel sounds normal  SKIN: no suspicious lesions or rashes  NEURO: fatigued     Results for orders placed or performed in visit on 03/15/22   XR CHEST 2 VW (Clinic Performed)     Status: None    Narrative    PROCEDURE:  XR CHEST 2 VW    HISTORY:  Post-COVID chronic cough; Post-COVID chronic cough.     COMPARISON:  None.    FINDINGS:   The cardiac silhouette is normal in size. The pulmonary vasculature is  normal.  The lungs are clear. No pleural effusion or pneumothorax.      Impression    IMPRESSION:  No acute cardiopulmonary disease.      RAFAL CORODN MD         SYSTEM ID:  [id number]   Results for orders placed or performed in visit on 03/15/22   Comprehensive metabolic panel (BMP + Alb, Alk Phos, ALT, AST, Total. Bili, TP)     Status: Normal   Result Value Ref Range    Sodium 136 133 - 144 mmol/L    Potassium 4.1 3.4 - 5.3 mmol/L    Chloride 105 94 - 109 mmol/L    Carbon Dioxide (CO2) 27 20 - 32 mmol/L    Anion Gap 4 3 - 14 mmol/L    Urea Nitrogen 14 7 - 30 mg/dL    Creatinine 0.75 0.66 - 1.25 mg/dL    Calcium 9.0 8.5 - 10.1 mg/dL    Glucose 99 70 - 99 mg/dL    Alkaline Phosphatase 77 40 - 150 U/L    AST 14 0 - 45 U/L    ALT 34 0 - 70 U/L    Protein Total 7.6 6.8 - 8.8 g/dL    Albumin 3.9 3.4 - 5.0 g/dL    Bilirubin Total 0.8 0.2 - 1.3 mg/dL    GFR Estimate >90 >60 mL/min/1.73m2   CBC with platelets and differential     Status: Abnormal   Result Value Ref Range    WBC Count 16.0 (H) 4.0 - 11.0 10e3/uL    RBC Count 4.63 4.40 - 5.90 10e6/uL    Hemoglobin 15.0 13.3 - 17.7 g/dL    Hematocrit 43.0 40.0 - 53.0 %    MCV 93 78 - 100 fL    MCH 32.4 26.5 - 33.0 pg    MCHC 34.9 31.5 - 36.5 g/dL    RDW 13.3 10.0 - 15.0 %    Platelet Count 268 150 - 450 10e3/uL   Manual Differential     Status: Abnormal   Result Value Ref Range    % Neutrophils 60 %    % Lymphocytes 16 %    % Monocytes 7 %    % Eosinophils 16 %    % Basophils 1 %    Absolute Neutrophils 9.6 (H) 1.6 - 8.3  10e3/uL    Absolute Lymphocytes 2.6 0.8 - 5.3 10e3/uL    Absolute Monocytes 1.1 0.0 - 1.3 10e3/uL    Absolute Eosinophils 2.6 (H) 0.0 - 0.7 10e3/uL    Absolute Basophils 0.2 0.0 - 0.2 10e3/uL    RBC Morphology Confirmed RBC Indices     Platelet Assessment  Automated Count Confirmed. Platelet morphology is normal.     Automated Count Confirmed. Platelet morphology is normal.   CBC with platelets and differential     Status: Abnormal    Narrative    The following orders were created for panel order CBC with platelets and differential.  Procedure                               Abnormality         Status                     ---------                               -----------         ------                     CBC with platelets and d...[[id number]]  Abnormal            Final result               Manual Differential[[id number]]          Abnormal            Final result                 Please view results for these tests on the individual orders.

## 2022-03-15 ENCOUNTER — OFFICE VISIT (OUTPATIENT)
Dept: FAMILY MEDICINE | Facility: OTHER | Age: 32
End: 2022-03-15
Attending: NURSE PRACTITIONER
Payer: COMMERCIAL

## 2022-03-15 ENCOUNTER — ANCILLARY PROCEDURE (OUTPATIENT)
Dept: GENERAL RADIOLOGY | Facility: OTHER | Age: 32
End: 2022-03-15
Attending: NURSE PRACTITIONER
Payer: COMMERCIAL

## 2022-03-15 VITALS
HEART RATE: 108 BPM | OXYGEN SATURATION: 97 % | DIASTOLIC BLOOD PRESSURE: 88 MMHG | TEMPERATURE: 98 F | SYSTOLIC BLOOD PRESSURE: 138 MMHG

## 2022-03-15 DIAGNOSIS — U09.9 POST-COVID CHRONIC COUGH: Primary | ICD-10-CM

## 2022-03-15 DIAGNOSIS — U09.9 POST-COVID CHRONIC COUGH: ICD-10-CM

## 2022-03-15 DIAGNOSIS — R05.3 POST-COVID CHRONIC COUGH: Primary | ICD-10-CM

## 2022-03-15 DIAGNOSIS — R05.3 POST-COVID CHRONIC COUGH: ICD-10-CM

## 2022-03-15 LAB
ALBUMIN SERPL-MCNC: 3.9 G/DL (ref 3.4–5)
ALP SERPL-CCNC: 77 U/L (ref 40–150)
ALT SERPL W P-5'-P-CCNC: 34 U/L (ref 0–70)
ANION GAP SERPL CALCULATED.3IONS-SCNC: 4 MMOL/L (ref 3–14)
AST SERPL W P-5'-P-CCNC: 14 U/L (ref 0–45)
BASOPHILS # BLD MANUAL: 0.2 10E3/UL (ref 0–0.2)
BASOPHILS NFR BLD MANUAL: 1 %
BILIRUB SERPL-MCNC: 0.8 MG/DL (ref 0.2–1.3)
BUN SERPL-MCNC: 14 MG/DL (ref 7–30)
CALCIUM SERPL-MCNC: 9 MG/DL (ref 8.5–10.1)
CHLORIDE BLD-SCNC: 105 MMOL/L (ref 94–109)
CO2 SERPL-SCNC: 27 MMOL/L (ref 20–32)
CREAT SERPL-MCNC: 0.75 MG/DL (ref 0.66–1.25)
EOSINOPHIL # BLD MANUAL: 2.6 10E3/UL (ref 0–0.7)
EOSINOPHIL NFR BLD MANUAL: 16 %
ERYTHROCYTE [DISTWIDTH] IN BLOOD BY AUTOMATED COUNT: 13.3 % (ref 10–15)
GFR SERPL CREATININE-BSD FRML MDRD: >90 ML/MIN/1.73M2
GLUCOSE BLD-MCNC: 99 MG/DL (ref 70–99)
HCT VFR BLD AUTO: 43 % (ref 40–53)
HGB BLD-MCNC: 15 G/DL (ref 13.3–17.7)
LYMPHOCYTES # BLD MANUAL: 2.6 10E3/UL (ref 0.8–5.3)
LYMPHOCYTES NFR BLD MANUAL: 16 %
MCH RBC QN AUTO: 32.4 PG (ref 26.5–33)
MCHC RBC AUTO-ENTMCNC: 34.9 G/DL (ref 31.5–36.5)
MCV RBC AUTO: 93 FL (ref 78–100)
MONOCYTES # BLD MANUAL: 1.1 10E3/UL (ref 0–1.3)
MONOCYTES NFR BLD MANUAL: 7 %
NEUTROPHILS # BLD MANUAL: 9.6 10E3/UL (ref 1.6–8.3)
NEUTROPHILS NFR BLD MANUAL: 60 %
PLAT MORPH BLD: ABNORMAL
PLATELET # BLD AUTO: 268 10E3/UL (ref 150–450)
POTASSIUM BLD-SCNC: 4.1 MMOL/L (ref 3.4–5.3)
PROT SERPL-MCNC: 7.6 G/DL (ref 6.8–8.8)
RBC # BLD AUTO: 4.63 10E6/UL (ref 4.4–5.9)
RBC MORPH BLD: ABNORMAL
SODIUM SERPL-SCNC: 136 MMOL/L (ref 133–144)
WBC # BLD AUTO: 16 10E3/UL (ref 4–11)

## 2022-03-15 PROCEDURE — 99215 OFFICE O/P EST HI 40 MIN: CPT | Performed by: NURSE PRACTITIONER

## 2022-03-15 PROCEDURE — 85027 COMPLETE CBC AUTOMATED: CPT | Performed by: NURSE PRACTITIONER

## 2022-03-15 PROCEDURE — 36415 COLL VENOUS BLD VENIPUNCTURE: CPT | Performed by: NURSE PRACTITIONER

## 2022-03-15 PROCEDURE — 80053 COMPREHEN METABOLIC PANEL: CPT | Performed by: NURSE PRACTITIONER

## 2022-03-15 PROCEDURE — 71046 X-RAY EXAM CHEST 2 VIEWS: CPT | Mod: TC | Performed by: RADIOLOGY

## 2022-03-15 RX ORDER — PREDNISONE 10 MG/1
TABLET ORAL
Qty: 20 TABLET | Refills: 0 | Status: SHIPPED | OUTPATIENT
Start: 2022-03-15 | End: 2023-11-06

## 2022-03-15 RX ORDER — CODEINE PHOSPHATE AND GUAIFENESIN 10; 100 MG/5ML; MG/5ML
1-2 SOLUTION ORAL EVERY 4 HOURS PRN
Qty: 180 ML | Refills: 0 | Status: SHIPPED | OUTPATIENT
Start: 2022-03-15 | End: 2023-11-06

## 2022-03-15 ASSESSMENT — PAIN SCALES - GENERAL: PAINLEVEL: NO PAIN (0)

## 2022-03-15 NOTE — NURSING NOTE
Chief Complaint   Patient presents with     Cough       Initial /88   Pulse 108   Temp 98  F (36.7  C) (Tympanic)   SpO2 97%  Estimated body mass index is 33.91 kg/m  as calculated from the following:    Height as of 3/19/20: 1.829 m (6').    Weight as of 3/8/22: 113.4 kg (250 lb).  Medication Reconciliation: justin Hernandez LPN

## 2022-03-15 NOTE — PATIENT INSTRUCTIONS
Prednisone taper as discussed    PFT - to check for a reactive airway     Referral to pulmonary medicine for further evaluation and treatment

## 2022-04-25 DIAGNOSIS — B02.9 HERPES ZOSTER WITHOUT COMPLICATION: Primary | ICD-10-CM

## 2022-04-25 RX ORDER — VALACYCLOVIR HYDROCHLORIDE 1 G/1
1000 TABLET, FILM COATED ORAL 3 TIMES DAILY
Qty: 21 TABLET | Refills: 0 | Status: SHIPPED | OUTPATIENT
Start: 2022-04-25 | End: 2023-11-06

## 2022-05-14 ENCOUNTER — HEALTH MAINTENANCE LETTER (OUTPATIENT)
Age: 32
End: 2022-05-14

## 2022-07-05 ENCOUNTER — HOSPITAL ENCOUNTER (OUTPATIENT)
Dept: RESPIRATORY THERAPY | Facility: HOSPITAL | Age: 32
Discharge: HOME | End: 2022-07-05
Attending: NURSE PRACTITIONER | Admitting: INTERNAL MEDICINE
Payer: COMMERCIAL

## 2022-07-05 DIAGNOSIS — U09.9 POST-COVID CHRONIC COUGH: ICD-10-CM

## 2022-07-05 DIAGNOSIS — R05.3 POST-COVID CHRONIC COUGH: ICD-10-CM

## 2022-07-05 LAB — HGB BLD-MCNC: 14.7 G/DL (ref 13.3–17.7)

## 2022-07-05 PROCEDURE — 85018 HEMOGLOBIN: CPT | Performed by: NURSE PRACTITIONER

## 2022-07-05 PROCEDURE — 94729 DIFFUSING CAPACITY: CPT | Mod: 26 | Performed by: INTERNAL MEDICINE

## 2022-07-05 PROCEDURE — 94010 BREATHING CAPACITY TEST: CPT | Mod: 26 | Performed by: INTERNAL MEDICINE

## 2022-07-05 PROCEDURE — 36415 COLL VENOUS BLD VENIPUNCTURE: CPT | Performed by: NURSE PRACTITIONER

## 2022-07-05 PROCEDURE — 94726 PLETHYSMOGRAPHY LUNG VOLUMES: CPT

## 2022-07-05 PROCEDURE — 94726 PLETHYSMOGRAPHY LUNG VOLUMES: CPT | Mod: 26 | Performed by: INTERNAL MEDICINE

## 2022-07-05 PROCEDURE — 94729 DIFFUSING CAPACITY: CPT

## 2022-07-05 PROCEDURE — 94010 BREATHING CAPACITY TEST: CPT

## 2022-07-05 RX ORDER — ALBUTEROL SULFATE 0.83 MG/ML
2.5 SOLUTION RESPIRATORY (INHALATION)
Status: DISCONTINUED | OUTPATIENT
Start: 2022-07-05 | End: 2022-07-06 | Stop reason: HOSPADM

## 2022-07-11 NOTE — RESULT ENCOUNTER NOTE
Pt results from PFT were noted from test on 7/5/22 per Dr. Ritter, looking to see if an appt was Scheduled for Pulmonary per this referral, called and spoke to Ashleigh at  in imaging and she stated this referral did not fall into their que, and  that their Pulmonary provider comes from [facility].    Sending referral to [facility] and notified them   Also notified pt.

## 2022-07-13 DIAGNOSIS — R05.3 POST-COVID CHRONIC COUGH: Primary | ICD-10-CM

## 2022-07-13 DIAGNOSIS — U09.9 POST-COVID CHRONIC COUGH: Primary | ICD-10-CM

## 2022-07-13 NOTE — PROGRESS NOTES
post covid cough and wheezing PFTs moderate diffusion defect pulmonary vascular    Sonido continues to have cough and wheezing. Low dose prednisone does help but symptoms return within a few days once he stops    Plan for echo -   Consider post covid clinic     Honey JOHN FNP-BC  Family Nurse Practitioner

## 2022-07-20 NOTE — RESULT ENCOUNTER NOTE
Attempt # 1  Outcome: Left Message   Comment: lvm for pt to call and let scheduling or [facility]'s nurse know If they had a follow up with pulmonology

## 2022-08-15 ENCOUNTER — HOSPITAL ENCOUNTER (OUTPATIENT)
Dept: CARDIOLOGY | Facility: HOSPITAL | Age: 32
Discharge: HOME | End: 2022-08-15
Attending: NURSE PRACTITIONER | Admitting: INTERNAL MEDICINE
Payer: COMMERCIAL

## 2022-08-15 DIAGNOSIS — R05.3 POST-COVID CHRONIC COUGH: ICD-10-CM

## 2022-08-15 DIAGNOSIS — U09.9 POST-COVID CHRONIC COUGH: ICD-10-CM

## 2022-08-15 LAB — LVEF ECHO: NORMAL

## 2022-08-15 PROCEDURE — 999N000208 ECHOCARDIOGRAM COMPLETE

## 2022-08-15 PROCEDURE — 255N000002 HC RX 255 OP 636: Performed by: NURSE PRACTITIONER

## 2022-08-15 PROCEDURE — 93306 TTE W/DOPPLER COMPLETE: CPT | Mod: 26 | Performed by: INTERNAL MEDICINE

## 2022-08-15 RX ADMIN — PERFLUTREN 1 ML: 6.52 INJECTION, SUSPENSION INTRAVENOUS at 10:35

## 2022-09-04 ENCOUNTER — HEALTH MAINTENANCE LETTER (OUTPATIENT)
Age: 32
End: 2022-09-04

## 2023-06-03 ENCOUNTER — HEALTH MAINTENANCE LETTER (OUTPATIENT)
Age: 33
End: 2023-06-03

## 2023-09-25 ENCOUNTER — TELEPHONE (OUTPATIENT)
Dept: FAMILY MEDICINE | Facility: OTHER | Age: 33
End: 2023-09-25

## 2023-09-25 NOTE — TELEPHONE ENCOUNTER
8:30 AM    Reason for Call: Phone Call    Description: Patient called and states he has been testing positive for COVID since Friday and has missed work because of it - he states he is needing a Drs note for the days he has missed. Please give him a call regarding this     Was an appointment offered for this call? No    Preferred method for responding to this message: Telephone Call  What is your phone number ?[phone]     If we cannot reach you directly, may we leave a detailed response at the number you provided? Yes    Can this message wait until your PCP/provider returns, if available today? Not applicable    Winnie Farris

## 2023-09-25 NOTE — TELEPHONE ENCOUNTER
Would need to see him to write note.  In general - if positive test, should isolate for at least 5 days per CDC.  Please assess current symptoms, etc.

## 2023-09-25 NOTE — TELEPHONE ENCOUNTER
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

## 2023-09-27 ENCOUNTER — OFFICE VISIT (OUTPATIENT)
Dept: FAMILY MEDICINE | Facility: OTHER | Age: 33
End: 2023-09-27
Attending: FAMILY MEDICINE
Payer: COMMERCIAL

## 2023-09-27 VITALS
WEIGHT: 247.4 LBS | BODY MASS INDEX: 33.55 KG/M2 | DIASTOLIC BLOOD PRESSURE: 97 MMHG | HEART RATE: 78 BPM | SYSTOLIC BLOOD PRESSURE: 137 MMHG | TEMPERATURE: 97.5 F | OXYGEN SATURATION: 98 %

## 2023-09-27 DIAGNOSIS — U07.1 INFECTION DUE TO 2019 NOVEL CORONAVIRUS: Primary | ICD-10-CM

## 2023-09-27 DIAGNOSIS — I10 ESSENTIAL HYPERTENSION: ICD-10-CM

## 2023-09-27 PROBLEM — J45.40 MODERATE PERSISTENT ASTHMA WITHOUT COMPLICATION: Status: ACTIVE | Noted: 2023-06-16

## 2023-09-27 PROCEDURE — 99213 OFFICE O/P EST LOW 20 MIN: CPT | Performed by: FAMILY MEDICINE

## 2023-09-27 RX ORDER — FLUTICASONE PROPIONATE 220 UG/1
2 AEROSOL, METERED RESPIRATORY (INHALATION) 2 TIMES DAILY
COMMUNITY
Start: 2023-06-16 | End: 2023-11-06

## 2023-09-27 ASSESSMENT — PAIN SCALES - GENERAL: PAINLEVEL: NO PAIN (0)

## 2023-09-27 NOTE — LETTER
September 27, 2023      Sonido Stevens  [address] [place] [address]        To Whom It May Concern:    Sonido Stevens was seen on 9/27/2023.  Please excuse him 9/22/23 - 9/26/23 due to illness/covid.        Sincerely,        Africa Bass MD

## 2023-09-27 NOTE — PROGRESS NOTES
Assessment & Plan     Infection due to 2019 novel coronavirus  Resolving.  Fatigue remains. Past 5 day isolation.  Today is day 7.  Can return to work.  Wear mask x total 10 days.  Continued rest, fluids, symptomatic cares.  Note completed for work.    Essential hypertension  Elevated today.  Several prior readings as well.  Overdue for physical, etc.  Scheduled prior to leaving today within next month to address.       See Patient Instructions    Return in about 1 month (around 10/27/2023) for physical and BP check.    Africa Bass MD  [facility] - [place] Head is a 33 year old, presenting for the following health issues:  Letter for School/Work        9/27/2023     8:35 AM   Additional Questions   Roomed by Man Anna   Accompanied by None         9/27/2023     8:35 AM   Patient Reported Additional Medications   Patient reports taking the following new medications None       HPI     Patient requesting letter to be excused 22nd, 25th and 26th due to covid     RESPIRATORY SYMPTOMS  Duration: 1 week  Description  nasal congestion, rhinorrhea, sore throat, cough, wheezing, fever - max 100- ear pain right, headache, fatigue/malaise, and myalgias  Severity: moderate  Accompanying signs and symptoms: None  History (predisposing factors):  none; this was 2nd time with covid  Precipitating or alleviating factors: None  Therapies tried and outcome:  rest and fluids Ibuprofen   Significant other sick as well      Review of Systems   Constitutional, HEENT, cardiovascular, pulmonary, gi and gu systems are negative, except as otherwise noted.      Objective    BP (!) 137/97 (BP Location: Right arm, Patient Position: Sitting, Cuff Size: Adult Large)   Pulse 78   Temp 97.5  F (36.4  C) (Tympanic)   Wt 112.2 kg (247 lb 6.4 oz)   SpO2 98%   BMI 33.55 kg/m    Body mass index is 33.55 kg/m .  Physical Exam   GENERAL: healthy, alert and no distress  EYES: Eyes grossly normal to  inspection, PERRL and conjunctivae and sclerae normal  HENT: normal cephalic/atraumatic, right ear: normal: no effusions, no erythema, normal landmarks, left ear: clear effusion, nose and mouth without ulcers or lesions, oropharynx clear, and oral mucous membranes moist  NECK: no adenopathy, no asymmetry, masses, or scars and thyroid normal to palpation  RESP: lungs clear to auscultation - no rales, rhonchi or wheezes  CV: regular rate and rhythm, normal S1 S2, no S3 or S4, no murmur, click or rub, no peripheral edema and peripheral pulses strong  MS: no gross musculoskeletal defects noted, no edema  PSYCH: mentation appears normal, affect normal/bright

## 2023-09-30 ENCOUNTER — HOSPITAL ENCOUNTER (EMERGENCY)
Facility: HOSPITAL | Age: 33
Discharge: HOME | End: 2023-09-30
Attending: STUDENT IN AN ORGANIZED HEALTH CARE EDUCATION/TRAINING PROGRAM | Admitting: PHYSICIAN ASSISTANT
Payer: COMMERCIAL

## 2023-09-30 VITALS
TEMPERATURE: 97.9 F | OXYGEN SATURATION: 97 % | RESPIRATION RATE: 16 BRPM | SYSTOLIC BLOOD PRESSURE: 148 MMHG | HEART RATE: 98 BPM | DIASTOLIC BLOOD PRESSURE: 97 MMHG

## 2023-09-30 DIAGNOSIS — M79.604 RIGHT LEG PAIN: ICD-10-CM

## 2023-09-30 LAB
D DIMER PPP FEU-MCNC: <0.3 UG/ML FEU (ref 0–0.5)
HOLD SPECIMEN: NORMAL
HOLD SPECIMEN: NORMAL

## 2023-09-30 PROCEDURE — 85379 FIBRIN DEGRADATION QUANT: CPT | Performed by: PHYSICIAN ASSISTANT

## 2023-09-30 PROCEDURE — 99283 EMERGENCY DEPT VISIT LOW MDM: CPT

## 2023-09-30 PROCEDURE — 36415 COLL VENOUS BLD VENIPUNCTURE: CPT | Performed by: PHYSICIAN ASSISTANT

## 2023-09-30 PROCEDURE — 99283 EMERGENCY DEPT VISIT LOW MDM: CPT | Performed by: PHYSICIAN ASSISTANT

## 2023-09-30 RX ORDER — CETIRIZINE HYDROCHLORIDE 10 MG/1
10 TABLET ORAL DAILY
COMMUNITY

## 2023-09-30 ASSESSMENT — ACTIVITIES OF DAILY LIVING (ADL): ADLS_ACUITY_SCORE: 35

## 2023-09-30 NOTE — ED PROVIDER NOTES
History     Chief Complaint   Patient presents with    Leg Pain     C/o rt lower leg pain off and on x 1 month. Denies injury but concerned about a blood clot.      HPI  Sonido Stevens is a 33 year old male who resents for right lower leg pain for a month.  Symptoms have gradually gotten worse.  He notes that it is a ache or throb/burning pain.  He denies any sharp pain.  Denies any injury that he is aware of.  Whether he is moving or at rest it does not change the pain.  He has not had any swelling or numbness.  He does smoke his uncle has had a history of a blood clot but no other family history that he is aware of.  He has not had any long periods of immobilization no recent surgeries.    Allergies:  No Known Allergies    Problem List:    Patient Active Problem List    Diagnosis Date Noted    Moderate persistent asthma without complication 06/16/2023     Priority: Medium    ACP (advance care planning) 08/04/2016     Priority: Medium     Advance Care Planning 8/4/2016: ACP Review of Chart / Resources Provided:  Reviewed chart for advance care plan.  Sonido Stevens has no plan or code status on file. Discussed available resources and provided with information. Added by Bri Balderrama            Flu syndrome 01/14/2013     Priority: Medium        Past Medical History:    No past medical history on file.    Past Surgical History:    Past Surgical History:   Procedure Laterality Date    wisdom teeth  2014       Family History:    Family History   Problem Relation Age of Onset    Diabetes No family hx of     Hypertension No family hx of        Social History:  Marital Status:  Single [1]  Social History     Tobacco Use    Smoking status: Former     Packs/day: 0.50     Types: Cigarettes    Smokeless tobacco: Former    Tobacco comments:     info given on [facility] classes, declined call it quits   Vaping Use    Vaping Use: Never used   Substance Use Topics    Alcohol use: Yes     Alcohol/week: 0.0 standard drinks of alcohol     Drug use: No        Medications:    albuterol (PROAIR HFA/PROVENTIL HFA/VENTOLIN HFA) 108 (90 Base) MCG/ACT inhaler  cetirizine (ZYRTEC) 10 MG tablet  fluticasone (FLOVENT HFA) 220 MCG/ACT inhaler  guaiFENesin-codeine (ROBITUSSIN AC) 100-10 MG/5ML solution  predniSONE (DELTASONE) 10 MG tablet  valACYclovir (VALTREX) 1000 mg tablet          Review of Systems   All other systems reviewed and are negative.      Physical Exam   BP: 148/97  Pulse: 98  Temp: 97.9  F (36.6  C)  Resp: 16  SpO2: 97 %      Physical Exam  Constitutional:       General: He is not in acute distress.     Appearance: Normal appearance. He is normal weight. He is not ill-appearing, toxic-appearing or diaphoretic.   HENT:      Head: Normocephalic and atraumatic.      Right Ear: External ear normal.      Left Ear: External ear normal.   Eyes:      Extraocular Movements: Extraocular movements intact.      Conjunctiva/sclera: Conjunctivae normal.      Pupils: Pupils are equal, round, and reactive to light.   Cardiovascular:      Rate and Rhythm: Normal rate.   Pulmonary:      Effort: Pulmonary effort is normal. No respiratory distress.   Musculoskeletal:         General: Normal range of motion.      Comments: Of Homans' sign negative Mujica test no edema in the right lower extremity.  Sensation intact throughout.   Skin:     General: Skin is warm and dry.      Coloration: Skin is not jaundiced or pale.   Neurological:      Mental Status: He is alert and oriented to person, place, and time. Mental status is at baseline.      Cranial Nerves: No cranial nerve deficit.   Psychiatric:         Mood and Affect: Mood normal.         ED Course      I have reviewed the epic chart, the nurses note and triage note. vital signs were reviewed.  Emergent ultrasound unavailable here at the moment.  D-dimer was obtained and is negative.  Discussed that having a negative D-dimer would rule out the patient for having a DVT at this time.  Unclear etiology this moment  will discuss with him further evaluation with his primary care doctor we discussed doing ibuprofen regimen with food 3 times a day for 4 to 5 days to see if this improves his symptoms.  Return to the ER symptoms getting worse.           Procedures                Results for orders placed or performed during the hospital encounter of 09/30/23 (from the past 24 hour(s))   D dimer quantitative   Result Value Ref Range    D-Dimer Quantitative <0.30 0.00 - 0.50 ug/mL FEU    Narrative    This D-dimer assay is intended for use in conjunction with a clinical pretest probability assessment model to exclude pulmonary embolism (PE) and deep venous thrombosis (DVT) in outpatients suspected of PE or DVT. The cut-off value is 0.50 ug/mL FEU.       Medications - No data to display    Assessments & Plan (with Medical Decision Making)     I have reviewed the nursing notes.    I have reviewed the findings, diagnosis, plan and need for follow up with the patient.          Medical Decision Making  The patient's presentation was of straightforward complexity (a clearly self-limited or minor problem).    The patient's evaluation involved:  ordering and/or review of 1 test(s) in this encounter (see separate area of note for details)    The patient's management necessitated only low risk treatment.        Discharge Medication List as of 9/30/2023  7:09 PM          Final diagnoses:   Right leg pain       9/30/2023   [facility]       Abhijit Galloway PA-C  09/30/23 1919

## 2023-10-01 NOTE — DISCHARGE INSTRUCTIONS
Ibuprofen regimen that you and I discussed.  If symptoms continue follow-up with primary care doctor.  D-dimer is negative therefore you do not have a blood clot at this time.

## 2023-10-01 NOTE — ED NOTES
Patient had left after ZACH BAKER visited with him.  Patient told ZACH BAKER that he has access to my chart for discharge instructions and does not want a printed copy.

## 2023-10-03 ENCOUNTER — NURSE TRIAGE (OUTPATIENT)
Dept: FAMILY MEDICINE | Facility: OTHER | Age: 33
End: 2023-10-03

## 2023-10-03 DIAGNOSIS — M25.561 RIGHT KNEE PAIN, UNSPECIFIED CHRONICITY: Primary | ICD-10-CM

## 2023-11-06 ENCOUNTER — OFFICE VISIT (OUTPATIENT)
Dept: FAMILY MEDICINE | Facility: OTHER | Age: 33
End: 2023-11-06
Attending: FAMILY MEDICINE
Payer: COMMERCIAL

## 2023-11-06 VITALS
SYSTOLIC BLOOD PRESSURE: 128 MMHG | WEIGHT: 241.5 LBS | BODY MASS INDEX: 34.57 KG/M2 | TEMPERATURE: 98.3 F | DIASTOLIC BLOOD PRESSURE: 90 MMHG | OXYGEN SATURATION: 97 % | HEART RATE: 83 BPM | HEIGHT: 70 IN

## 2023-11-06 DIAGNOSIS — I10 ESSENTIAL HYPERTENSION: ICD-10-CM

## 2023-11-06 DIAGNOSIS — Z13.220 LIPID SCREENING: ICD-10-CM

## 2023-11-06 DIAGNOSIS — J45.40 MODERATE PERSISTENT ASTHMA WITHOUT COMPLICATION: ICD-10-CM

## 2023-11-06 DIAGNOSIS — Z00.00 ROUTINE GENERAL MEDICAL EXAMINATION AT A HEALTH CARE FACILITY: Primary | ICD-10-CM

## 2023-11-06 DIAGNOSIS — Z13.1 SCREENING FOR DIABETES MELLITUS: ICD-10-CM

## 2023-11-06 LAB
ALBUMIN SERPL BCG-MCNC: 4.6 G/DL (ref 3.5–5.2)
ALP SERPL-CCNC: 61 U/L (ref 40–129)
ALT SERPL W P-5'-P-CCNC: 17 U/L (ref 0–70)
ANION GAP SERPL CALCULATED.3IONS-SCNC: 10 MMOL/L (ref 7–15)
AST SERPL W P-5'-P-CCNC: 21 U/L (ref 0–45)
BASOPHILS # BLD AUTO: 0 10E3/UL (ref 0–0.2)
BASOPHILS NFR BLD AUTO: 1 %
BILIRUB SERPL-MCNC: 0.6 MG/DL
BUN SERPL-MCNC: 11.6 MG/DL (ref 6–20)
CALCIUM SERPL-MCNC: 10 MG/DL (ref 8.6–10)
CHLORIDE SERPL-SCNC: 102 MMOL/L (ref 98–107)
CHOLEST SERPL-MCNC: 151 MG/DL
CREAT SERPL-MCNC: 0.86 MG/DL (ref 0.67–1.17)
DEPRECATED HCO3 PLAS-SCNC: 26 MMOL/L (ref 22–29)
EGFRCR SERPLBLD CKD-EPI 2021: >90 ML/MIN/1.73M2
EOSINOPHIL # BLD AUTO: 0.2 10E3/UL (ref 0–0.7)
EOSINOPHIL NFR BLD AUTO: 3 %
ERYTHROCYTE [DISTWIDTH] IN BLOOD BY AUTOMATED COUNT: 12.8 % (ref 10–15)
EST. AVERAGE GLUCOSE BLD GHB EST-MCNC: 94 MG/DL
GLUCOSE SERPL-MCNC: 98 MG/DL (ref 70–99)
HBA1C MFR BLD: 4.9 %
HCT VFR BLD AUTO: 41.7 % (ref 40–53)
HDLC SERPL-MCNC: 65 MG/DL
HGB BLD-MCNC: 14.7 G/DL (ref 13.3–17.7)
IMM GRANULOCYTES # BLD: 0 10E3/UL
IMM GRANULOCYTES NFR BLD: 0 %
LDLC SERPL CALC-MCNC: 74 MG/DL
LYMPHOCYTES # BLD AUTO: 2 10E3/UL (ref 0.8–5.3)
LYMPHOCYTES NFR BLD AUTO: 32 %
MCH RBC QN AUTO: 31.7 PG (ref 26.5–33)
MCHC RBC AUTO-ENTMCNC: 35.3 G/DL (ref 31.5–36.5)
MCV RBC AUTO: 90 FL (ref 78–100)
MONOCYTES # BLD AUTO: 0.5 10E3/UL (ref 0–1.3)
MONOCYTES NFR BLD AUTO: 8 %
NEUTROPHILS # BLD AUTO: 3.5 10E3/UL (ref 1.6–8.3)
NEUTROPHILS NFR BLD AUTO: 56 %
NONHDLC SERPL-MCNC: 86 MG/DL
NRBC # BLD AUTO: 0 10E3/UL
NRBC BLD AUTO-RTO: 0 /100
PLATELET # BLD AUTO: 266 10E3/UL (ref 150–450)
POTASSIUM SERPL-SCNC: 4.1 MMOL/L (ref 3.4–5.3)
PROT SERPL-MCNC: 7.8 G/DL (ref 6.4–8.3)
RBC # BLD AUTO: 4.64 10E6/UL (ref 4.4–5.9)
SODIUM SERPL-SCNC: 138 MMOL/L (ref 135–145)
TRIGL SERPL-MCNC: 60 MG/DL
WBC # BLD AUTO: 6.2 10E3/UL (ref 4–11)

## 2023-11-06 PROCEDURE — 90677 PCV20 VACCINE IM: CPT | Performed by: FAMILY MEDICINE

## 2023-11-06 PROCEDURE — 83036 HEMOGLOBIN GLYCOSYLATED A1C: CPT | Performed by: FAMILY MEDICINE

## 2023-11-06 PROCEDURE — 85025 COMPLETE CBC W/AUTO DIFF WBC: CPT | Performed by: FAMILY MEDICINE

## 2023-11-06 PROCEDURE — 80061 LIPID PANEL: CPT | Performed by: FAMILY MEDICINE

## 2023-11-06 PROCEDURE — 99395 PREV VISIT EST AGE 18-39: CPT | Mod: 25 | Performed by: FAMILY MEDICINE

## 2023-11-06 PROCEDURE — 90746 HEPB VACCINE 3 DOSE ADULT IM: CPT | Performed by: FAMILY MEDICINE

## 2023-11-06 PROCEDURE — 90471 IMMUNIZATION ADMIN: CPT | Performed by: FAMILY MEDICINE

## 2023-11-06 PROCEDURE — 99213 OFFICE O/P EST LOW 20 MIN: CPT | Mod: 25 | Performed by: FAMILY MEDICINE

## 2023-11-06 PROCEDURE — 80053 COMPREHEN METABOLIC PANEL: CPT | Performed by: FAMILY MEDICINE

## 2023-11-06 PROCEDURE — 36415 COLL VENOUS BLD VENIPUNCTURE: CPT | Performed by: FAMILY MEDICINE

## 2023-11-06 PROCEDURE — 90472 IMMUNIZATION ADMIN EACH ADD: CPT | Performed by: FAMILY MEDICINE

## 2023-11-06 RX ORDER — FLUTICASONE PROPIONATE 220 UG/1
2 AEROSOL, METERED RESPIRATORY (INHALATION) 2 TIMES DAILY
Qty: 12 G | Refills: 3 | Status: SHIPPED | OUTPATIENT
Start: 2023-11-06 | End: 2024-02-26 | Stop reason: ALTCHOICE

## 2023-11-06 ASSESSMENT — ENCOUNTER SYMPTOMS
DIARRHEA: 0
DYSURIA: 0
CONSTIPATION: 0
PALPITATIONS: 0
SHORTNESS OF BREATH: 0
CHILLS: 0
JOINT SWELLING: 0
NAUSEA: 0
COUGH: 0
ARTHRALGIAS: 1
DIZZINESS: 0
PARESTHESIAS: 0
HEADACHES: 0
HEARTBURN: 0
NERVOUS/ANXIOUS: 0
HEMATURIA: 0
SORE THROAT: 0
MYALGIAS: 0
WEAKNESS: 0
FEVER: 0
ABDOMINAL PAIN: 0
FREQUENCY: 0
HEMATOCHEZIA: 0
EYE PAIN: 0

## 2023-11-06 ASSESSMENT — ASTHMA QUESTIONNAIRES: ACT_TOTALSCORE: 21

## 2023-11-06 ASSESSMENT — PAIN SCALES - GENERAL: PAINLEVEL: NO PAIN (0)

## 2023-11-06 NOTE — PATIENT INSTRUCTIONS
Pneumonia and Hepatitis B vaccines given today.  Will notify of lab results.  Reduce salt, caffeine.  Continue exercise and weight loss efforts.  Treatment for high blood pressure advised.   Plan to recheck in office in 2 months.    Preventive Health Recommendations  Male Ages 26 - 39    Yearly exam:             See your health care provider every year in order to  o   Review health changes.   o   Discuss preventive care.    o   Review your medicines if your doctor has prescribed any.  You should be tested each year for STDs (sexually transmitted diseases), if you re at risk.   After age 35, talk to your provider about cholesterol testing. If you are at risk for heart disease, have your cholesterol tested at least every 5 years.   If you are at risk for diabetes, you should have a diabetes test (fasting glucose).  Shots: Get a flu shot each year. Get a tetanus shot every 10 years.     Nutrition:  Eat at least 5 servings of fruits and vegetables daily.   Eat whole-grain bread, whole-wheat pasta and brown rice instead of white grains and rice.   Get adequate Calcium and Vitamin D.     Lifestyle  Exercise for at least 150 minutes a week (30 minutes a day, 5 days a week). This will help you control your weight and prevent disease.   Limit alcohol to one drink per day.   No smoking.   Wear sunscreen to prevent skin cancer.   See your dentist every six months for an exam and cleaning.

## 2023-11-06 NOTE — PROGRESS NOTES
SUBJECTIVE:   CC: Sonido is an 33 year old who presents for preventative health visit.       Healthy Habits:     Getting at least 3 servings of Calcium per day:  Yes    Bi-annual eye exam:  NO    Dental care twice a year:  NO    Sleep apnea or symptoms of sleep apnea:  None    Diet:  Regular (no restrictions)    Frequency of exercise:  2-3 days/week    Duration of exercise:  30-45 minutes    Taking medications regularly:  Yes    Medication side effects:  None    Additional concerns today:  No    Vaccines - hepatitis b, prenvar 20   Fasting labs - did not fast today     1 cup AM coffee.  Water otherwise.  Calorie counting.  Using catalino.  Tracking sleep, water, calories, steps.  Alcohol - weekends - social - has cut back.  Loves salt, jerky.  Down over 10 pounds recently.  Goal for trip this spring.   2 months - **    Asthma Follow-Up  Was ACT completed today?  Yes        11/6/2023     8:42 AM   ACT Total Scores   ACT TOTAL SCORE (Goal Greater than or Equal to 20) 21   In the past 12 months, how many times did you visit the emergency room for your asthma without being admitted to the hospital? 0   In the past 12 months, how many times were you hospitalized overnight because of your asthma? 0        How many days per week do you miss taking your asthma controller medication?  0  Please describe any recent triggers for your asthma: Patient is unaware of triggers  Have you had any Emergency Room Visits, Urgent Care Visits, or Hospital Admissions since your last office visit?  No  No rescue inhaler use  Has flovent - once daily -     Have you ever done Advance Care Planning? (For example, a Health Directive, POLST, or a discussion with a medical provider or your loved ones about your wishes): No, advance care planning information given to patient to review.  Patient plans to discuss their wishes with loved ones or provider.      Social History     Tobacco Use    Smoking status: Former     Packs/day: .5     Types: Cigarettes  "   Smokeless tobacco: Former    Tobacco comments:     info given on [facility] classes, declined call it quits   Substance Use Topics    Alcohol use: Yes     Alcohol/week: 0.0 standard drinks of alcohol             11/6/2023     8:40 AM   Alcohol Use   Prescreen: >3 drinks/day or >7 drinks/week? Yes   AUDIT SCORE  5        Last PSA: No results found for: \"PSA\"    Reviewed orders with patient. Reviewed health maintenance and updated orders accordingly - Yes  Current Outpatient Medications   Medication    albuterol (PROAIR HFA/PROVENTIL HFA/VENTOLIN HFA) 108 (90 Base) MCG/ACT inhaler    cetirizine (ZYRTEC) 10 MG tablet    fluticasone (FLOVENT HFA) 220 MCG/ACT inhaler     No current facility-administered medications for this visit.       Lab work is in process  Labs reviewed in EPIC    Reviewed and updated as needed this visit by clinical staff   Tobacco  Allergies  Meds  Problems             Reviewed and updated as needed this visit by Provider     Meds  Problems            No past medical history on file.   Past Surgical History:   Procedure Laterality Date    wisdom teeth  2014       Review of Systems   Constitutional:  Negative for chills and fever.   HENT:  Negative for congestion, ear pain, hearing loss and sore throat.    Eyes:  Negative for pain and visual disturbance.   Respiratory:  Negative for cough and shortness of breath.    Cardiovascular:  Negative for chest pain, palpitations and peripheral edema.   Gastrointestinal:  Negative for abdominal pain, constipation, diarrhea, heartburn, hematochezia and nausea.   Genitourinary:  Negative for dysuria, frequency, genital sores, hematuria, impotence, penile discharge and urgency.   Musculoskeletal:  Positive for arthralgias. Negative for joint swelling and myalgias.   Skin:  Negative for rash.   Neurological:  Negative for dizziness, weakness, headaches and paresthesias.   Psychiatric/Behavioral:  Negative for mood changes. The patient is not nervous/anxious.  " "      OBJECTIVE:   BP (!) 128/90 (BP Location: Right arm, Patient Position: Sitting, Cuff Size: Adult Large)   Pulse 83   Temp 98.3  F (36.8  C) (Tympanic)   Ht 1.788 m (5' 10.4\")   Wt 109.5 kg (241 lb 8 oz)   SpO2 97%   BMI 34.26 kg/m      Physical Exam  GENERAL: healthy, alert, no distress, and over weight  EYES: Eyes grossly normal to inspection, PERRL and conjunctivae and sclerae normal  HENT: ear canals and TM's normal, nose and mouth without ulcers or lesions  NECK: no adenopathy, no asymmetry, masses, or scars and thyroid normal to palpation  RESP: lungs clear to auscultation - no rales, rhonchi or wheezes  CV: regular rate and rhythm, normal S1 S2, no S3 or S4, no murmur, click or rub, no peripheral edema and peripheral pulses strong  ABDOMEN: soft, nontender, no hepatosplenomegaly, no masses and bowel sounds normal   (male): normal male genitalia without lesions or urethral discharge, no hernia  MS: no gross musculoskeletal defects noted, no edema  SKIN: no suspicious lesions or rashes  NEURO: Normal strength and tone, mentation intact and speech normal  PSYCH: mentation appears normal, affect normal/bright    Diagnostic Test Results:  Labs reviewed in Epic  Labs pending    ASSESSMENT/PLAN:       ICD-10-CM    1. Routine general medical examination at a health care facility  Z00.00       2. Moderate persistent asthma without complication  J45.40 CBC with platelets and differential     fluticasone (FLOVENT HFA) 220 MCG/ACT inhaler     CBC with platelets and differential      3. Essential hypertension  I10       4. Lipid screening  Z13.220 Lipid Profile (Chol, Trig, HDL, LDL calc)     Lipid Profile (Chol, Trig, HDL, LDL calc)      5. Screening for diabetes mellitus  Z13.1 Hemoglobin A1c     Comprehensive metabolic panel (BMP + Alb, Alk Phos, ALT, AST, Total. Bili, TP)     Hemoglobin A1c     Comprehensive metabolic panel (BMP + Alb, Alk Phos, ALT, AST, Total. Bili, TP)      6. BMI 34.0-34.9,adult  " Z68.34           Pneumonia and Hepatitis B vaccines given today.  Declined flu,  covid.    Asthma stable - did offer Symbicort SMART therapy; Flovent with prn Albuterol working well, so prefers to continue current regimen.    Will notify of lab results.    Reduce salt, caffeine.  Continue exercise and weight loss efforts.  Treatment for high blood pressure advised.   Patient declines today.  Plan to recheck in office in 2 months, after additional lifestyle efforts, and reconsider if still elevated.    COUNSELING:   Reviewed preventive health counseling, as reflected in patient instructions       Regular exercise       Healthy diet/nutrition       Vision screening       Hearing screening       Pneumococcal Vaccine        Immunizations  Vaccinated for: Hepatitis B and Pneumococcal and Declined: Covid-19 and Influenza due to Conscientious objector           Alcohol Use         He reports that he has quit smoking. His smoking use included cigarettes. He smoked an average of .5 packs per day. He has quit using smokeless tobacco.            Africa Bass MD  [facility] - [place]

## 2024-01-08 ENCOUNTER — OFFICE VISIT (OUTPATIENT)
Dept: FAMILY MEDICINE | Facility: OTHER | Age: 34
End: 2024-01-08
Attending: FAMILY MEDICINE
Payer: COMMERCIAL

## 2024-01-08 VITALS
SYSTOLIC BLOOD PRESSURE: 132 MMHG | WEIGHT: 232.2 LBS | DIASTOLIC BLOOD PRESSURE: 92 MMHG | BODY MASS INDEX: 33.24 KG/M2 | HEIGHT: 70 IN | OXYGEN SATURATION: 98 % | TEMPERATURE: 98 F | HEART RATE: 86 BPM

## 2024-01-08 DIAGNOSIS — R06.83 SNORING: ICD-10-CM

## 2024-01-08 DIAGNOSIS — I10 ESSENTIAL HYPERTENSION: Primary | ICD-10-CM

## 2024-01-08 DIAGNOSIS — J45.40 MODERATE PERSISTENT ASTHMA WITHOUT COMPLICATION: ICD-10-CM

## 2024-01-08 DIAGNOSIS — R53.83 FATIGUE, UNSPECIFIED TYPE: ICD-10-CM

## 2024-01-08 PROCEDURE — 99214 OFFICE O/P EST MOD 30 MIN: CPT | Performed by: FAMILY MEDICINE

## 2024-01-08 RX ORDER — CHLORTHALIDONE 25 MG/1
12.5 TABLET ORAL DAILY
Qty: 45 TABLET | Refills: 1 | Status: SHIPPED | OUTPATIENT
Start: 2024-01-08 | End: 2024-02-26

## 2024-01-08 ASSESSMENT — ASTHMA QUESTIONNAIRES
QUESTION_2 LAST FOUR WEEKS HOW OFTEN HAVE YOU HAD SHORTNESS OF BREATH: ONCE OR TWICE A WEEK
ACT_TOTALSCORE: 22
QUESTION_3 LAST FOUR WEEKS HOW OFTEN DID YOUR ASTHMA SYMPTOMS (WHEEZING, COUGHING, SHORTNESS OF BREATH, CHEST TIGHTNESS OR PAIN) WAKE YOU UP AT NIGHT OR EARLIER THAN USUAL IN THE MORNING: ONCE OR TWICE
QUESTION_4 LAST FOUR WEEKS HOW OFTEN HAVE YOU USED YOUR RESCUE INHALER OR NEBULIZER MEDICATION (SUCH AS ALBUTEROL): ONCE A WEEK OR LESS
ACT_TOTALSCORE: 22
QUESTION_5 LAST FOUR WEEKS HOW WOULD YOU RATE YOUR ASTHMA CONTROL: COMPLETELY CONTROLLED
QUESTION_1 LAST FOUR WEEKS HOW MUCH OF THE TIME DID YOUR ASTHMA KEEP YOU FROM GETTING AS MUCH DONE AT WORK, SCHOOL OR AT HOME: NONE OF THE TIME

## 2024-01-08 ASSESSMENT — PAIN SCALES - GENERAL: PAINLEVEL: NO PAIN (0)

## 2024-01-08 NOTE — PATIENT INSTRUCTIONS
Start Chlorthalidone 25 mg tab for BP - take 1/2 tab to start.  Take in AM.  Update me via [employer] in 1-2 weeks - with BP readings and how you feel.    Follow up in 1 month in person - morning time - labs too.    Sleep study referral in place as well.

## 2024-01-08 NOTE — PROGRESS NOTES
"  Assessment & Plan     Essential hypertension  On multiple occasions now - home and office.  Making lifestyle changes.  Weight down.  Reducing salt, alcohol.  Increase exercise.  Discussed anti hypertensive agents - diuretics, ace inhibitors, calcium channel blockers.  Trial of chlorthalidone 25 mg tab - will start with 1/2 tab first.  Update me with home readings.  Office visit 1 month with lab.  Sleep study referral.  - chlorthalidone (HYGROTON) 25 MG tablet; Take 0.5 tablets (12.5 mg) by mouth daily  - Basic metabolic panel; Future    Moderate persistent asthma without complication  Stable.    BMI 32.0-32.9,adult  Weight down    Snoring  Referral for sleep study.  - Adult Sleep Eval & Management  Referral; Future    Fatigue, unspecified type  Plan for AM testosterone level draw at follow up visit.  - Testosterone Free and Total; Future  - Adult Sleep Eval & Management  Referral; Future       BMI:   Estimated body mass index is 32.94 kg/m  as calculated from the following:    Height as of this encounter: 1.788 m (5' 10.4\").    Weight as of this encounter: 105.3 kg (232 lb 3.2 oz).   Weight management plan: Discussed healthy diet and exercise guidelines    See Patient Instructions    Return in about 1 year (around 1/8/2025) for BP Recheck; with lab.    Africa Bass MD  [facility] - [place] Head is a 33 year old, presenting for the following health issues:  Hypertension and Asthma      HPI     Wants his hormones checked - testosterone.   Lack of energy/motivation.  No sexual dysfunction.  No issue with libido.    Vaccines - declines    Hypertension Follow-up - physical 11/2023 128/90 - 2 month recheck - lifestyle modifications - prefers to avoid medication  Do you check your blood pressure regularly outside of the clinic? Yes   Are you following a low salt diet? Trying to  Are your blood pressures ever more than 140 on the top number (systolic) OR more   than 90 " "on the bottom number (diastolic), for example 140/90? Yes  Home BP 130s/90s.    Salt - still significant  Alcohol - holidays - difficult time to reduce; socialization  Coffee - AM only; no energy drinks  Calorie counting - slacked off during holidays some  Weight - down 9 pounds past 2 months; BMI now 32  Limited exercise.  Labs normal 23.  MGM - HTN  PGF -  of MI  Snoring - regularly; no witnessed apnea  Frequent.      Asthma Follow-Up  Was ACT completed today?  Yes        2024     9:08 AM   ACT Total Scores   ACT TOTAL SCORE (Goal Greater than or Equal to 20) 22   In the past 12 months, how many times did you visit the emergency room for your asthma without being admitted to the hospital? 0   In the past 12 months, how many times were you hospitalized overnight because of your asthma? 0        How many days per week do you miss taking your asthma controller medication?  0  Please describe any recent triggers for your asthma:  covid   Have you had any Emergency Room Visits, Urgent Care Visits, or Hospital Admissions since your last office visit?  No        Review of Systems   Constitutional, HEENT, cardiovascular, pulmonary, gi and gu systems are negative, except as otherwise noted.      Objective    BP (!) 130/90 (BP Location: Left arm, Patient Position: Sitting, Cuff Size: Adult Large)   Pulse 86   Temp 98  F (36.7  C) (Tympanic)   Ht 1.788 m (5' 10.4\")   Wt 105.3 kg (232 lb 3.2 oz)   SpO2 98%   BMI 32.94 kg/m    Body mass index is 32.94 kg/m .  Physical Exam   GENERAL: healthy, alert and no distress  EYES: Eyes grossly normal to inspection, PERRL and conjunctivae and sclerae normal  NECK: no adenopathy, no asymmetry, masses, or scars and thyroid normal to palpation  RESP: lungs clear to auscultation - no rales, rhonchi or wheezes  CV: regular rate and rhythm, normal S1 S2, no S3 or S4, no murmur, click or rub, no peripheral edema and peripheral pulses strong  MS: no gross musculoskeletal " defects noted, no edema  PSYCH: mentation appears normal, affect normal/bright

## 2024-01-09 ENCOUNTER — MYC MEDICAL ADVICE (OUTPATIENT)
Dept: PULMONOLOGY | Facility: OTHER | Age: 34
End: 2024-01-09

## 2024-01-09 ASSESSMENT — SLEEP AND FATIGUE QUESTIONNAIRES
HOW LIKELY ARE YOU TO NOD OFF OR FALL ASLEEP WHILE SITTING QUIETLY AFTER LUNCH WITHOUT ALCOHOL: HIGH CHANCE OF DOZING
HOW LIKELY ARE YOU TO NOD OFF OR FALL ASLEEP WHILE LYING DOWN TO REST IN THE AFTERNOON WHEN CIRCUMSTANCES PERMIT: HIGH CHANCE OF DOZING
HOW LIKELY ARE YOU TO NOD OFF OR FALL ASLEEP WHILE SITTING AND TALKING TO SOMEONE: WOULD NEVER DOZE
HOW LIKELY ARE YOU TO NOD OFF OR FALL ASLEEP WHILE SITTING INACTIVE IN A PUBLIC PLACE: HIGH CHANCE OF DOZING
HOW LIKELY ARE YOU TO NOD OFF OR FALL ASLEEP IN A CAR, WHILE STOPPED FOR A FEW MINUTES IN TRAFFIC: WOULD NEVER DOZE
HOW LIKELY ARE YOU TO NOD OFF OR FALL ASLEEP WHILE SITTING AND READING: SLIGHT CHANCE OF DOZING
HOW LIKELY ARE YOU TO NOD OFF OR FALL ASLEEP WHEN YOU ARE A PASSENGER IN A CAR FOR AN HOUR WITHOUT A BREAK: WOULD NEVER DOZE
HOW LIKELY ARE YOU TO NOD OFF OR FALL ASLEEP WHILE WATCHING TV: HIGH CHANCE OF DOZING

## 2024-01-15 NOTE — PROGRESS NOTES
01/09/24 7495   Reason For Your Visit   Please briefly describe the main reason(s) for your sleep visit test for sleep apnea   Approximately when did this problem start mid 20s maybe 10 years ago   What are your goals for this visit find out whether i have sleep apnea and do corretive measures to sleep better, have more energy and reduce BP   Time in Bed - Work Or School Days   Do you work or go to school Yes   What time do you usually get into bed 9:30 when I'm on day shift which is most of the time, but during the summer I do vacation fill so I could work afternoons or midnights   About how long does it take you to fall asleep sometimes an hour, sometimes 4   How often do you have trouble falling asleep 4   How often do you wake up during the night usually 1 bathroom and 2 toss and turn   Do you work days/evenings/nights/rotating shifts Days;Evenings;Nights;Rotating Shifts   What wakes you up at night Snorting self awake;Pain;Use the bathroom;Nightmares   How often do you have trouble falling back to sleep once or twice   About how long does it take to fall back to sleep bathroom and nightmares usually fall back asleep immediately, comfort or pain could be an hour or more   What do you usually do if you have trouble getting back to sleep drink a glass of water, chamomile if im really having trouble   What time do you usually get out of bed to start your day 5:15am   Do you use an alarm Yes   Time in Bed - Weekends/Non-work Days/All Other Days   What time do you usually get into bed 11pm   About how long does it take you to fall asleep 15mins   What time do you usually get out of bed to start your day 8am   Do you use an alarm Yes   Sleep Need   On average, about how much sleep do you think you get 6hrs   About how much sleep do you think you need 7hrs   Sleep Position   Which sleep positions do you prefer Back;Side;Recliner   Do you do any of the following activities in bed Watch TV;Use phone, computer, or  tablet   How often do you take a nap on purpose 4   About how long are your naps depends, sometimes I'll sneak a 10 min catnap on lunchbreak or a 2hr nap after work if i feel like i haven't gotten enough sleep during the week   Do you feel better after naps Yes   How often do you doze off unintentionally maybe once every couple months if I've had a bad week of insomnia or hectic shift changes, but only in the mornings   Have you ever had a driving accident or near-miss due to sleepiness/drowsiness No   Sleep Disruptions - Breathing/Snoring   Do you snore Yes   Do other people complain about your snoring Yes   Have you been told you stop breathing in your sleep No   Do you have issues with any of the following Morning mouth dryness;Stuffy nose when you wake up   Sleep Disruptions - Movement   Do you get pain, discomfort, with an urge to move Yes   Does it happen when you are resting Yes   Does it get better if you move around Yes   Does it happen more at night Yes   Have you been told you kick your legs at night Yes   Sleep Disruptions - Behaviours in Sleep   Have you ever experienced any of the following during your sleep Recurring Nightmares;Kicking or punching;Waking up paralyzed;See or hear things that are not really there upon awakening or just falling asleep   Do you ever experience sudden muscle weakness during the day No   Caffeine, Alcohol and Other Substances   How many caffeinated beverages (coffee, tea, soda, energy drinks) per day 1 12oz coffee   What time of day is your last caffeine use 8am m-f 9:30am weekends   List any prescribed or over the counter stimulants that you take none   List any prescribed or over the counter sleep medication you take none   Do you drink alcohol to help you sleep Yes   Do you drink alcohol near bedtime Yes   Family History   Has any family member been diagnosed with a sleep disorder No   In the last TWO WEEKS have you experienced any of the following symptoms?   Fevers No    Night Sweats No   Weight Gain No   Pain at Night No   Double Vision No   Changes in Vision No   Difficulty Breathing through Nose Yes   Sore Throat in Morning No   Dry Mouth in the Morning Yes   Shortness of Breath Lying Flat No   Shortness of Breath With Activity No   Awakening with Shortness of Breath Yes   Increased Cough No   Heart Racing at Night No   Swelling in Feet or Legs No   Diarrhea at Night No   Heartburn at Night No   Urinating More than Once at Night No   Losing Control of Urine at Night No   Joint Pains at Night Yes   Headaches in Morning No   Weakness in Arms or Legs No   Depressed Mood Yes   Anxiety Yes         1/9/2024    11:04 PM    [place] Sleepiness Scale ( LONA Tovar  [id number]<br>ESS - [place]/English - Final version - 21 Nov 07 - [facility] [place].)   Sitting and reading Slight chance of dozing   Watching TV High chance of dozing   Sitting, inactive in a public place (e.g. a theatre or a meeting) High chance of dozing   As a passenger in a car for an hour without a break Would never doze   Lying down to rest in the afternoon when circumstances permit High chance of dozing   Sitting and talking to someone Would never doze   Sitting quietly after a lunch without alcohol High chance of dozing   In a car, while stopped for a few minutes in traffic Would never doze   [place] Score (MC) 13   [place] Score (Sleep) 13         1/9/2024    10:52 PM   Insomnia Severity Index (HATTIE)   Difficulty falling asleep 3   Difficulty staying asleep 2   Problems waking up too early 2   How SATISFIED/DISSATISFIED are you with your CURRENT sleep pattern? 3   How NOTICEABLE to others do you think your sleep problem is in terms of impairing the quality of your life? 1   How WORRIED/DISTRESSED are you about your current sleep problem? 1   To what extent do you consider your sleep problem to INTERFERE with your daily functioning (e.g. daytime fatigue, mood, ability to function at work/daily chores, concentration,  memory, mood, etc.) CURRENTLY? 2   HATTIE Total Score 14         1/9/2024    11:01 PM   STOP BANG Questionnaire (  2008, the American Society [employer]esthes[employer], [employer]   1. Snoring - Do you snore loudly (louder than talking or loud enough to be heard through closed doors)? Yes   2. Tired - Do you often feel tired, fatigued, or sleepy during daytime? Yes   3. Observed - Has anyone observed you stop breathing during your sleep? No   4. Blood pressure - Do you have or are you being treated for high blood pressure? Yes   5. BMI - BMI more than 35 kg/m2? No   6. Age - Age over 50 yr old? No   7. Neck circumference - Neck circumference greater than 40 cm? No   8. Gender - Gender male? Yes   STOP BANG Score (MC): 3 (High risk of CHANO)

## 2024-01-16 NOTE — TELEPHONE ENCOUNTER
"Chart review prior to sleep testing.    Patient Summary:  33 year old male who is referred for sleep disordered breathing.    Patient Active Problem List    Diagnosis Date Noted    Essential hypertension 11/06/2023     Priority: Medium    Moderate persistent asthma without complication 06/16/2023     Priority: Medium    ACP (advance care planning) 08/04/2016     Priority: Medium     Advance Care Planning 8/4/2016: ACP Review of Chart / Resources Provided:  Reviewed chart for advance care plan.  Sonido Stevens has no plan or code status on file. Discussed available resources and provided with information. Added by Bri Balderrama               Current Outpatient Medications   Medication    albuterol (PROAIR HFA/PROVENTIL HFA/VENTOLIN HFA) 108 (90 Base) MCG/ACT inhaler    cetirizine (ZYRTEC) 10 MG tablet    chlorthalidone (HYGROTON) 25 MG tablet    fluticasone (FLOVENT HFA) 220 MCG/ACT inhaler     No current facility-administered medications for this visit.       Pertinent PMHx of hypertension, moderate persistent asthma.    STOP-BANG score of 3, with unknown neck circumference.  [place] score of 13.  HATTIE: 14    BMI of Estimated body mass index is 32.94 kg/m  as calculated from the following:    Height as of 1/8/24: 1.788 m (5' 10.4\").    Weight as of 1/8/24: 105.3 kg (232 lb 3.2 oz).     Chief concern per questionnaire: \"test for sleep apnea \"    Duration of symptoms:  \"mid 20s maybe 10 years ago \"    Goals for visit per questionnaire: \"find out whether i have sleep apnea and do corretive measures to sleep better, have more energy and reduce BP \"    Sleep pattern:  Workdays.  9:30pm - 5:15am.  Weekends.  11pm to 8am.  Time to fall asleep: ~1-4 hours.  Awakenings: 1-2 times per night, usually brief minutes to return to sleep.  Average total sleep time:  6 hours  Napping.  4 days per week, 10 minutes - 2 hours per nap.  Yes for TV, phone in bed.    Yes for RLS screen.  No for sleep walking.  Yes for dream enactment " behavior.  No for bruxism.  Yes for recurring nightmares, possible hypnagogic or hypnopompic hallucinations    No for morning headaches.  Yes for snoring.  No for observed apnea.  No for FHx of CHANO.    Caffeine use:  No for 3+ per day.  No for within 6 hours of bed.    A/P:    1.)  Borderline increased pretest likelihood of CHANO with STOP-BANG score of 3.   - Would appear to be candidate for either home sleep testing or in-lab PSG.    ---  This note was written with the assistance of the Dragon voice-dictation technology software. The final document, although reviewed, may contain errors. For corrections, please contact the office.    Abhijit Reese MD    Sleep Medicine  [place], [place]  Main Office: [phone]  [facility] [address], [address]  Schedule visits: [phone]  Main Office: [phone]  Fax: [phone]

## 2024-01-18 ENCOUNTER — TELEPHONE (OUTPATIENT)
Dept: PULMONOLOGY | Facility: OTHER | Age: 34
End: 2024-01-18

## 2024-01-24 ENCOUNTER — MYC MEDICAL ADVICE (OUTPATIENT)
Dept: FAMILY MEDICINE | Facility: OTHER | Age: 34
End: 2024-01-24

## 2024-02-05 DIAGNOSIS — R53.82 CHRONIC FATIGUE: ICD-10-CM

## 2024-02-05 DIAGNOSIS — R06.83 SNORING: Primary | ICD-10-CM

## 2024-02-20 ENCOUNTER — HOSPITAL ENCOUNTER (EMERGENCY)
Facility: HOSPITAL | Age: 34
End: 2024-02-20
Payer: COMMERCIAL

## 2024-02-20 ENCOUNTER — THERAPY VISIT (OUTPATIENT)
Dept: SLEEP MEDICINE | Facility: HOSPITAL | Age: 34
End: 2024-02-20
Attending: FAMILY MEDICINE
Payer: COMMERCIAL

## 2024-02-20 DIAGNOSIS — R53.82 CHRONIC FATIGUE: ICD-10-CM

## 2024-02-20 DIAGNOSIS — R06.83 SNORING: ICD-10-CM

## 2024-02-20 PROCEDURE — 95810 POLYSOM 6/> YRS 4/> PARAM: CPT

## 2024-02-20 PROCEDURE — 95810 POLYSOM 6/> YRS 4/> PARAM: CPT | Mod: 26 | Performed by: FAMILY MEDICINE

## 2024-02-21 NOTE — PROGRESS NOTES
The patient presents to the sleep center with complaints of sleep disordered breathing. The patient's AHI was 1.8, with the lowest o2 saturation being 87.6%, and the time below 89% being 2.6 minutes. No sleep aid was taken. A normal sinus rhythm was observed. Minimal limb movements were present. No snoring was noted by the tech. All stages of sleep were observed. The total sleep time was 468.5 minutes, sleep efficiency was 93%, and the sleep latency was 21.4 minutes. The patient tolerated the test well.

## 2024-02-23 NOTE — PROGRESS NOTES
Assessment & Plan     Essential hypertension  130/80 today - improved.  Home readings still a bit high.  Will have him bring home meter to next check to calibrate.  Continue healthy diet, exercise, limit salt and caffeine.  Follow up with sleep medicine, though sounds like negative for CHANO.  - chlorthalidone (HYGROTON) 25 MG tablet; Take 1 tablet (25 mg) by mouth daily    Moderate persistent asthma without complication  Flovent no longer covered/available?  Albuterol makes him jittery, though rarely uses it.  Has environmental allergies - cat - that sleeps with them.  Change to Symbicort (if covered - or alternative agent).  - budesonide-formoterol (SYMBICORT) 160-4.5 MCG/ACT Inhaler; Inhale 2 puffs into the lungs 2 times daily    Fatigue, unspecified type  Sleep consult as noted above.  Lab today - testosterone pending.          See Patient Instructions    Return in about 1 month (around 3/26/2024) for BP recheck nurse only.    Marisol Head is a 33 year old, presenting for the following health issues:  Hypertension and Asthma        9/27/2023     8:35 AM   Additional Questions   Roomed by Man Anna   Accompanied by None     HPI       Vaccines - Hep B     Hypertension Follow-up - Chlothalidone 12.5 mg started 1/8/24  Do you check your blood pressure regularly outside of the clinic? Yes   Are you following a low salt diet? Yes  Are your blood pressures ever more than 140 on the top number (systolic) OR more   than 90 on the bottom number (diastolic), for example 140/90? Sometimes   Sleep study negative 2/20/24  140/90 at home  No low.  No change in diet, exercise.  Shift work.    Asthma Follow-Up  Was ACT completed today?  Yes        2/26/2024     8:54 AM   ACT Total Scores   ACT TOTAL SCORE (Goal Greater than or Equal to 20) 21   In the past 12 months, how many times did you visit the emergency room for your asthma without being admitted to the hospital? 0   In the past 12 months, how many times were  "you hospitalized overnight because of your asthma? 0        How many days per week do you miss taking your asthma controller medication?  0  Please describe any recent triggers for your asthma: Patient is unaware of triggers  Have you had any Emergency Room Visits, Urgent Care Visits, or Hospital Admissions since your last office visit?  No  Flovent BID  Out x 1 month  No change in albuterol in past month - used it twice - causes jitters  Requesting a new Flovent HFA inhaler was told this product is no longer being made         Review of Systems  Constitutional, HEENT, cardiovascular, pulmonary, gi and gu systems are negative, except as otherwise noted.      Objective    /80 (BP Location: Left arm, Patient Position: Sitting, Cuff Size: Adult Regular)   Pulse 77   Temp 97.9  F (36.6  C) (Tympanic)   Ht 1.788 m (5' 10.4\")   Wt 105.5 kg (232 lb 9.6 oz)   SpO2 97%   BMI 33.00 kg/m    Body mass index is 33 kg/m .  Physical Exam   GENERAL: alert and no distress  EYES: Eyes grossly normal to inspection, PERRL and conjunctivae and sclerae normal  HENT: ear canals and TM's normal, nose and mouth without ulcers or lesions  NECK: no adenopathy, no asymmetry, masses, or scars  RESP: lungs clear to auscultation - no rales, rhonchi or wheezes  CV: regular rate and rhythm, normal S1 S2, no S3 or S4, no murmur, click or rub, no peripheral edema  MS: no gross musculoskeletal defects noted, no edema  PSYCH: mentation appears normal, affect normal/bright    Results for orders placed or performed in visit on 02/26/24 (from the past 24 hour(s))   Basic metabolic panel   Result Value Ref Range    Sodium 139 135 - 145 mmol/L    Potassium 3.6 3.4 - 5.3 mmol/L    Chloride 101 98 - 107 mmol/L    Carbon Dioxide (CO2) 28 22 - 29 mmol/L    Anion Gap 10 7 - 15 mmol/L    Urea Nitrogen 19.1 6.0 - 20.0 mg/dL    Creatinine 0.90 0.67 - 1.17 mg/dL    GFR Estimate >90 >60 mL/min/1.73m2    Calcium 9.8 8.6 - 10.0 mg/dL    Glucose 107 (H) 70 - " 99 mg/dL   Testosterone Free and Total    Narrative    The following orders were created for panel order Testosterone Free and Total.  Procedure                               Abnormality         Status                     ---------                               -----------         ------                     Sex Hormone Binding Glob...[[id number]]                      In process                 Testosterone Free and Total[[id number]]                      In process                   Please view results for these tests on the individual orders.       Testosterone levels pending.    Signed Electronically by: Africa Bass MD

## 2024-02-26 ENCOUNTER — LAB (OUTPATIENT)
Dept: LAB | Facility: OTHER | Age: 34
End: 2024-02-26
Payer: COMMERCIAL

## 2024-02-26 ENCOUNTER — OFFICE VISIT (OUTPATIENT)
Dept: FAMILY MEDICINE | Facility: OTHER | Age: 34
End: 2024-02-26
Attending: FAMILY MEDICINE
Payer: COMMERCIAL

## 2024-02-26 VITALS
TEMPERATURE: 97.9 F | OXYGEN SATURATION: 97 % | WEIGHT: 232.6 LBS | HEIGHT: 70 IN | HEART RATE: 77 BPM | SYSTOLIC BLOOD PRESSURE: 130 MMHG | DIASTOLIC BLOOD PRESSURE: 80 MMHG | BODY MASS INDEX: 33.3 KG/M2

## 2024-02-26 DIAGNOSIS — I10 ESSENTIAL HYPERTENSION: ICD-10-CM

## 2024-02-26 DIAGNOSIS — R53.83 FATIGUE, UNSPECIFIED TYPE: ICD-10-CM

## 2024-02-26 DIAGNOSIS — J45.40 MODERATE PERSISTENT ASTHMA WITHOUT COMPLICATION: ICD-10-CM

## 2024-02-26 DIAGNOSIS — I10 ESSENTIAL HYPERTENSION: Primary | ICD-10-CM

## 2024-02-26 LAB
ANION GAP SERPL CALCULATED.3IONS-SCNC: 10 MMOL/L (ref 7–15)
BUN SERPL-MCNC: 19.1 MG/DL (ref 6–20)
CALCIUM SERPL-MCNC: 9.8 MG/DL (ref 8.6–10)
CHLORIDE SERPL-SCNC: 101 MMOL/L (ref 98–107)
CREAT SERPL-MCNC: 0.9 MG/DL (ref 0.67–1.17)
DEPRECATED HCO3 PLAS-SCNC: 28 MMOL/L (ref 22–29)
EGFRCR SERPLBLD CKD-EPI 2021: >90 ML/MIN/1.73M2
GLUCOSE SERPL-MCNC: 107 MG/DL (ref 70–99)
POTASSIUM SERPL-SCNC: 3.6 MMOL/L (ref 3.4–5.3)
SHBG SERPL-SCNC: 26 NMOL/L (ref 11–80)
SODIUM SERPL-SCNC: 139 MMOL/L (ref 135–145)

## 2024-02-26 PROCEDURE — 90746 HEPB VACCINE 3 DOSE ADULT IM: CPT | Performed by: FAMILY MEDICINE

## 2024-02-26 PROCEDURE — 80048 BASIC METABOLIC PNL TOTAL CA: CPT

## 2024-02-26 PROCEDURE — 36415 COLL VENOUS BLD VENIPUNCTURE: CPT

## 2024-02-26 PROCEDURE — 84403 ASSAY OF TOTAL TESTOSTERONE: CPT

## 2024-02-26 PROCEDURE — 90471 IMMUNIZATION ADMIN: CPT | Performed by: FAMILY MEDICINE

## 2024-02-26 PROCEDURE — 99214 OFFICE O/P EST MOD 30 MIN: CPT | Mod: 25 | Performed by: FAMILY MEDICINE

## 2024-02-26 PROCEDURE — 84270 ASSAY OF SEX HORMONE GLOBUL: CPT

## 2024-02-26 RX ORDER — CHLORTHALIDONE 25 MG/1
25 TABLET ORAL DAILY
Qty: 90 TABLET | Refills: 2 | Status: SHIPPED | OUTPATIENT
Start: 2024-02-26

## 2024-02-26 RX ORDER — BUDESONIDE AND FORMOTEROL FUMARATE DIHYDRATE 160; 4.5 UG/1; UG/1
2 AEROSOL RESPIRATORY (INHALATION) 2 TIMES DAILY
Qty: 10.2 G | Refills: 2 | Status: SHIPPED | OUTPATIENT
Start: 2024-02-26

## 2024-02-26 ASSESSMENT — ASTHMA QUESTIONNAIRES
QUESTION_1 LAST FOUR WEEKS HOW MUCH OF THE TIME DID YOUR ASTHMA KEEP YOU FROM GETTING AS MUCH DONE AT WORK, SCHOOL OR AT HOME: NONE OF THE TIME
QUESTION_4 LAST FOUR WEEKS HOW OFTEN HAVE YOU USED YOUR RESCUE INHALER OR NEBULIZER MEDICATION (SUCH AS ALBUTEROL): ONCE A WEEK OR LESS
ACT_TOTALSCORE: 21
QUESTION_5 LAST FOUR WEEKS HOW WOULD YOU RATE YOUR ASTHMA CONTROL: WELL CONTROLLED
QUESTION_3 LAST FOUR WEEKS HOW OFTEN DID YOUR ASTHMA SYMPTOMS (WHEEZING, COUGHING, SHORTNESS OF BREATH, CHEST TIGHTNESS OR PAIN) WAKE YOU UP AT NIGHT OR EARLIER THAN USUAL IN THE MORNING: ONCE OR TWICE
ACT_TOTALSCORE: 21
QUESTION_2 LAST FOUR WEEKS HOW OFTEN HAVE YOU HAD SHORTNESS OF BREATH: ONCE OR TWICE A WEEK

## 2024-02-26 ASSESSMENT — PAIN SCALES - GENERAL: PAINLEVEL: NO PAIN (0)

## 2024-02-26 NOTE — PROGRESS NOTES
"Virtual Visit Details    Type of service:  Video Visit     Originating Location (pt. Location): Other work    Distant Location (provider location):  Off-site  Platform used for Video Visit: Harjinder Stevens is a 33 year old male who is being evaluated via a billable video visit.       The patient has been notified of following:      \"This video visit will be conducted via a call between you and your physician/provider. We have found that certain health care needs can be provided without the need for an in-person physical exam.  This service lets us provide the care you need with a video conversation.  If a prescription is necessary we can send it directly to your pharmacy.  If lab work is needed we can place an order for that and you can then stop by our lab to have the test done at a later time.     Video visits are billed at different rates depending on your insurance coverage.  Please reach out to your insurance provider with any questions.     If during the course of the call the physician/provider feels a video visit is not appropriate, you will not be charged for this service.\"     Patient has given verbal consent for Video visit? Yes  How would you like to obtain your AVS? Mail a copy  If you are dropped from the video visit, the video invite should be resent to: Text to cell phone: -  Will anyone else be joining your video visit? No  If patient encounters technical issues they should call [phone]      Video-Visit Details     Type of service:  Video Visit     Start Time:  8:02 AM  End Time: 8:28 AM    Originating Location (pt. Location): Other work     Distant Location (provider location):  Off-site, [facility] [place]       Platform used for Video Visit: Telephone    Virtual visit for follow-up regarding sleep testing and daytime somnolence.     A/P:     1.)  No sleep-disordered breathing (AHI 1.8) without sleep-associated hypoxemia  - No CPAP treatment recommended.  - Iron " "levels, patient will get blood draw at his primary care appointment in a month.  - Recommended improvements to sleep hygiene including nap regulation.    SUBJECTIVE:  Sonido Stevens is a 33 year old male.    33 year old male who is referred for sleep disordered breathing.     Pertinent PMHx of hypertension, moderate persistent asthma.     STOP-BANG score of 3, with unknown neck circumference.  [place] score of 13.  HATTIE: 14     BMI of Estimated body mass index is 32.94 kg/m  as calculated from the following:    Height as of 1/8/24: 1.788 m (5' 10.4\").    Weight as of 1/8/24: 105.3 kg (232 lb 3.2 oz).      Today - Sonido is here to review the results of his sleep apnea testing. 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 He often feels like he is chasing sleep. He endorses that he struggles to fall asleep most nights and takes naps many times throughout the week (ranging from 10 minutes to a few hours). Sometimes he will nap as late at 6pm.    Sonido also endorses hypnic jerks and sleep paralysis. He says the sleep paralysis will take place a few nights in a row and then not again for up to a month. He has a sense that things are out of place during these episodes.    Sonido also endorses some weight loss over the past few months and we discussed how this could be beneficial to his sleep.    Chief concern per questionnaire: \"test for sleep apnea \"     Duration of symptoms:  \"mid 20s maybe 10 years ago \"     Goals for visit per questionnaire: \"find out whether i have sleep apnea and do corretive measures to sleep better, have more energy and reduce BP \"     Sleep pattern:  Workdays.  9:30pm - 5:15am.  Weekends.  11pm to 8am.  Time to fall asleep: ~1-4 hours.  Awakenings: 1-2 times per night, usually brief minutes to return to sleep.  Average total sleep time:  6 hours  Napping.  4 days per week, 10 minutes - 2 hours per nap.  Yes for TV, phone in bed.     Yes for RLS " "screen.  No for sleep walking.  Yes for dream enactment behavior.  No for bruxism.  Yes for recurring nightmares, possible hypnagogic or hypnopompic hallucinations     No for morning headaches.  Yes for snoring.  No for observed apnea.  No for FHx of CHANO.     Caffeine use:  No for 3+ per day.  No for within 6 hours of bed.    SLEEP STUDY INTERPRETATION  DIAGNOSTIC POLYSOMNOGRAPHY REPORT        Patient: ERIKA HILL  YOB: 1990  Study Date: 2/20/2024  MRN: [id number]  Referring Provider: Africa Ritter MD  Ordering Provider: Abhijit Reese MD     Indications for Polysomnography: The patient is a 33 year old Male who is 5' 10\" and weighs 232.0 lbs. His BMI is 33.6, [place] sleepiness scale 13 and neck circumference is - cm. Relevant medical history includes hypertension, moderate persistent asthma. A diagnostic polysomnogram was performed to evaluate for sleep apne.     Polysomnogram Data: A full night polysomnogram recorded the standard physiologic parameters including EEG, EOG, EMG, ECG, nasal and oral airflow. Respiratory parameters of chest and abdominal movements were recorded with respiratory inductance plethysmography. Oxygen saturation was recorded by pulse oximetry. Hypopnea scoring rule used: 1B 4%.     Sleep Architecture: Delayed REM latency, typical percentages of sleep stages, supine REM was observed.  Normal arousal index.  The total recording time of the polysomnogram was 503.6 minutes. The total sleep time was 468.5 minutes. Sleep latency was normal at 21.4 minutes without the use of a sleep aid. REM latency was 162.0 minutes. Arousal index was normal at 7.3 arousals per hour. Sleep efficiency was normal at 93.0%. Wake after sleep onset was 13.5 minutes. The patient spent 3.1% of total sleep time in Stage N1, 47.9% in Stage N2, 27.9% in Stage N3, and 21.1% in REM. Time in REM supine was 73.5 minutes.     Respiration: No sleep-disordered breathing (AHI 1.8) without sleep-associated " hypoxemia.  Events ? The polysomnogram revealed a presence of 7 obstructive, - central, and - mixed apneas resulting in an apnea index of 0.9 events per hour. There were 7 obstructive hypopneas and - central hypopneas resulting in an obstructive hypopnea index of 0.9 and central hypopnea index of - events per hour. The combined apnea/hypopnea index was 1.8 events per hour (central apnea/hypopnea index was - events per hour). The REM AHI was 7.3 events per hour. The supine AHI was 2.1 events per hour. The RERA index was - events per hour.  The RDI was 1.8 events per hour.  Snoring - was reported as loud.  Respiratory rate and pattern - was notable for normal respiratory rate and pattern.  Sustained Sleep Associated Hypoventilation - Transcutaneous carbon dioxide monitoring was not used, however significant hypoventilation was not suggested by oximetry.  Sleep Associated Hypoxemia - (Greater than 5 minutes O2 sat at or below 88%) was not present. Baseline oxygen saturation was 93.4%. Lowest oxygen saturation was 85.3%. Time spent less than or equal to 88% was 1.2 minutes. Time spent less than or equal to 89% was 2.6 minutes.     Movement Activity: Nothing of note  Periodic Limb Activity - There were - PLMs during the entire study. The PLM index was - movements per hour. The PLM Arousal Index was - per hour.  REM EMG Activity - Excessive transient/sustained muscle activity was not present.  Nocturnal Behavior - Abnormal sleep related behaviors were not noted during/arising out of NREM / REM sleep.   Bruxism - None apparent.     Cardiac Summary: Appears NSR  The average pulse rate was 76.0 bpm. The minimum pulse rate was 61.0 bpm while the maximum pulse rate was 108.0 bpm.       Assessment:   Delayed REM latency, typical percentages of sleep stages, supine REM was observed.  Normal arousal index.  No sleep-disordered breathing (AHI 1.8) without sleep-associated hypoxemia.     Recommendations:  Advice regarding the risks  of drowsy driving.  Suggest optimizing sleep schedule and avoiding sleep deprivation.  Weight management (if BMI > 30).        Diagnostic Codes:   Unspecified Sleep Disturbance G47.9     _____________________________________   Electronically Signed By: Abhijit Reese MD (2/25/2024)       Past medical history:    Patient Active Problem List    Diagnosis Date Noted    Essential hypertension 11/06/2023     Priority: Medium    Moderate persistent asthma without complication 06/16/2023     Priority: Medium    ACP (advance care planning) 08/04/2016     Priority: Medium     Advance Care Planning 8/4/2016: ACP Review of Chart / Resources Provided:  Reviewed chart for advance care plan.  Sonido Stevens has no plan or code status on file. Discussed available resources and provided with information. Added by Bri Balderrama               10 point ROS of systems including Constitutional, Eyes, Respiratory, Cardiovascular, Gastroenterology, Genitourinary, Integumentary, Muscularskeletal, Psychiatric were all negative except for pertinent positives noted in my HPI.    Current Outpatient Medications   Medication Sig Dispense Refill    albuterol (PROAIR HFA/PROVENTIL HFA/VENTOLIN HFA) 108 (90 Base) MCG/ACT inhaler Inhale 2 puffs into the lungs every 6 hours 18 g 0    cetirizine (ZYRTEC) 10 MG tablet Take 10 mg by mouth daily      chlorthalidone (HYGROTON) 25 MG tablet Take 0.5 tablets (12.5 mg) by mouth daily 45 tablet 1    fluticasone (FLOVENT HFA) 220 MCG/ACT inhaler Inhale 2 puffs into the lungs 2 times daily 12 g 3       OBJECTIVE:  There were no vitals taken for this visit.    Physical Exam     ---  This note was written with the assistance of the Dragon voice-dictation technology software. The final document, although reviewed, may contain errors. For corrections, please contact the office.    Total time spent preparing to see the patient, review of chart, obtaining history and physical examination, review of sleep testing,  review of treatment options, education, discussion with patient and documenting in Epic / EMR was 25 minutes.  All time involved was spent on the day of service for the patient (the same day as the patient's appointment).    Abhijit Reese MD    Sleep Medicine  [place], [place]  Main Office: [phone]  [facility] [address], [address]  Schedule visits: [phone]  Main Office: [phone]  Fax: [phone]

## 2024-02-26 NOTE — LETTER
My Asthma Action Plan    Name: Sonido Stevens   YOB: 1990  Date: 2/26/2024   My doctor: Africa Bass MD   My clinic: [facility] [place]        My Rescue Medicine:   Albuterol inhaler (Proair/Ventolin/Proventil HFA)  2-4 puffs EVERY 4 HOURS as needed. Use a spacer if recommended by your provider.   My Asthma Severity:     Know your asthma triggers:   covid           GREEN ZONE   Good Control  I feel good  No cough or wheeze  Can work, sleep and play without asthma symptoms       Take your asthma control medicine every day.     If exercise triggers your asthma, take your rescue medication  15 minutes before exercise or sports, and  During exercise if you have asthma symptoms  Spacer to use with inhaler: If you have a spacer, make sure to use it with your inhaler             YELLOW ZONE Getting Worse  I have ANY of these:  I do not feel good  Cough or wheeze  Chest feels tight  Wake up at night   Keep taking your Green Zone medications  Start taking your rescue medicine:  every 20 minutes for up to 1 hour. Then every 4 hours for 24-48 hours.  If you stay in the Yellow Zone for more than 12-24 hours, contact your doctor.  If you do not return to the Green Zone in 12-24 hours or you get worse, start taking your oral steroid medicine if prescribed by your provider.           RED ZONE Medical Alert - Get Help  I have ANY of these:  I feel awful  Medicine is not helping  Breathing getting harder  Trouble walking or talking  Nose opens wide to breathe       Take your rescue medicine NOW  If your provider has prescribed an oral steroid medicine, start taking it NOW  Call your doctor NOW  If you are still in the Red Zone after 20 minutes and you have not reached your doctor:  Take your rescue medicine again and  Call 911 or go to the emergency room right away    See your regular doctor within 2 weeks of an Emergency Room or Urgent Care visit for follow-up treatment.          Annual  Reminders:  Meet with Asthma Educator,  Flu Shot in the Fall, consider Pneumonia Vaccination for patients with asthma (aged 19 and older).    Pharmacy:    [facility] [address] [place] [facility] [place], [place] - [address] [place] [address] [facility] [place] [facility] [address]    Electronically signed by Africa Bass MD   Date: 02/26/24                    Asthma Triggers  How To Control Things That Make Your Asthma Worse    Triggers are things that make your asthma worse.  Look at the list below to help you find your triggers and   what you can do about them. You can help prevent asthma flare-ups by staying away from your triggers.      Trigger                                                          What you can do   Cigarette Smoke  Tobacco smoke can make asthma worse. Do not allow smoking in your home, car or around you.  Be sure no one smokes at a child s day care or school.  If you smoke, ask your health care provider for ways to help you quit.  Ask family members to quit too.  Ask your health care provider for a referral to Quit Plan to help you quit smoking, or call 1-015-166-PLAN.     Colds, Flu, Bronchitis  These are common triggers of asthma. Wash your hands often.  Don t touch your eyes, nose or mouth.  Get a flu shot every year.     Dust Mites  These are tiny bugs that live in cloth or carpet. They are too small to see. Wash sheets and blankets in hot water every week.   Encase pillows and mattress in dust mite proof covers.  Avoid having carpet if you can. If you have carpet, vacuum weekly.   Use a dust mask and HEPA vacuum.   Pollen and Outdoor Mold  Some people are allergic to trees, grass, or weed pollen, or molds. Try to keep your windows closed.  Limit time out doors when pollen count is high.   Ask you health care provider about taking medicine during allergy season.     Animal Dander  Some people are allergic to skin flakes, urine or saliva from pets with fur or feathers. Keep pets with fur or  feathers out of your home.    If you can t keep the pet outdoors, then keep the pet out of your bedroom.  Keep the bedroom door closed.  Keep pets off cloth furniture and away from stuffed toys.     Mice, Rats, and Cockroaches  Some people are allergic to the waste from these pests.   Cover food and garbage.  Clean up spills and food crumbs.  Store grease in the refrigerator.   Keep food out of the bedroom.   Indoor Mold  This can be a trigger if your home has high moisture. Fix leaking faucets, pipes, or other sources of water.   Clean moldy surfaces.  Dehumidify basement if it is damp and smelly.   Smoke, Strong Odors, and Sprays  These can reduce air quality. Stay away from strong odors and sprays, such as perfume, powder, hair spray, paints, smoke incense, paint, cleaning products, candles and new carpet.   Exercise or Sports  Some people with asthma have this trigger. Be active!  Ask your doctor about taking medicine before sports or exercise to prevent symptoms.    Warm up for 5-10 minutes before and after sports or exercise.     Other Triggers of Asthma  Cold air:  Cover your nose and mouth with a scarf.  Sometimes laughing or crying can be a trigger.  Some medicines and food can trigger asthma.

## 2024-02-26 NOTE — PROCEDURES
"-   SLEEP STUDY INTERPRETATION  DIAGNOSTIC POLYSOMNOGRAPHY REPORT      Patient: ERIKA HILL  YOB: 1990  Study Date: 2/20/2024  MRN: [id number]  Referring Provider: Africa Ritter MD  Ordering Provider: Abhijit Reese MD    Indications for Polysomnography: The patient is a 33 year old Male who is 5' 10\" and weighs 232.0 lbs. His BMI is 33.6, [place] sleepiness scale 13 and neck circumference is - cm. Relevant medical history includes hypertension, moderate persistent asthma. A diagnostic polysomnogram was performed to evaluate for sleep apne.    Polysomnogram Data: A full night polysomnogram recorded the standard physiologic parameters including EEG, EOG, EMG, ECG, nasal and oral airflow. Respiratory parameters of chest and abdominal movements were recorded with respiratory inductance plethysmography. Oxygen saturation was recorded by pulse oximetry. Hypopnea scoring rule used: 1B 4%.    Sleep Architecture: Delayed REM latency, typical percentages of sleep stages, supine REM was observed.  Normal arousal index.  The total recording time of the polysomnogram was 503.6 minutes. The total sleep time was 468.5 minutes. Sleep latency was normal at 21.4 minutes without the use of a sleep aid. REM latency was 162.0 minutes. Arousal index was normal at 7.3 arousals per hour. Sleep efficiency was normal at 93.0%. Wake after sleep onset was 13.5 minutes. The patient spent 3.1% of total sleep time in Stage N1, 47.9% in Stage N2, 27.9% in Stage N3, and 21.1% in REM. Time in REM supine was 73.5 minutes.    Respiration: No sleep-disordered breathing (AHI 1.8) without sleep-associated hypoxemia.  Events ? The polysomnogram revealed a presence of 7 obstructive, - central, and - mixed apneas resulting in an apnea index of 0.9 events per hour. There were 7 obstructive hypopneas and - central hypopneas resulting in an obstructive hypopnea index of 0.9 and central hypopnea index of - events per hour. The combined " apnea/hypopnea index was 1.8 events per hour (central apnea/hypopnea index was - events per hour). The REM AHI was 7.3 events per hour. The supine AHI was 2.1 events per hour. The RERA index was - events per hour.  The RDI was 1.8 events per hour.  Snoring - was reported as loud.  Respiratory rate and pattern - was notable for normal respiratory rate and pattern.  Sustained Sleep Associated Hypoventilation - Transcutaneous carbon dioxide monitoring was not used, however significant hypoventilation was not suggested by oximetry.  Sleep Associated Hypoxemia - (Greater than 5 minutes O2 sat at or below 88%) was not present. Baseline oxygen saturation was 93.4%. Lowest oxygen saturation was 85.3%. Time spent less than or equal to 88% was 1.2 minutes. Time spent less than or equal to 89% was 2.6 minutes.    Movement Activity: Nothing of note  Periodic Limb Activity - There were - PLMs during the entire study. The PLM index was - movements per hour. The PLM Arousal Index was - per hour.  REM EMG Activity - Excessive transient/sustained muscle activity was not present.  Nocturnal Behavior - Abnormal sleep related behaviors were not noted during/arising out of NREM / REM sleep.   Bruxism - None apparent.    Cardiac Summary: Appears NSR  The average pulse rate was 76.0 bpm. The minimum pulse rate was 61.0 bpm while the maximum pulse rate was 108.0 bpm.      Assessment:   Delayed REM latency, typical percentages of sleep stages, supine REM was observed.  Normal arousal index.  No sleep-disordered breathing (AHI 1.8) without sleep-associated hypoxemia.    Recommendations:  Advice regarding the risks of drowsy driving.  Suggest optimizing sleep schedule and avoiding sleep deprivation.  Weight management (if BMI > 30).      Diagnostic Codes:   Unspecified Sleep Disturbance G47.9     _____________________________________   Electronically Signed By: Abhijit Reese MD (2/25/2024)

## 2024-02-26 NOTE — PATIENT INSTRUCTIONS
Nurse only BP recheck 1 month.  Bring home cuff to calibrate as well.  Continue Chlorthalidone.  Refills sent.    Change from Flovent to Symbicort twice daily.    Hep B #2 given.  3rd dose due at 6 months.    Annual physical as scheduled.

## 2024-02-27 ENCOUNTER — VIRTUAL VISIT (OUTPATIENT)
Dept: PULMONOLOGY | Facility: OTHER | Age: 34
End: 2024-02-27
Attending: FAMILY MEDICINE
Payer: COMMERCIAL

## 2024-02-27 VITALS — BODY MASS INDEX: 30.48 KG/M2 | WEIGHT: 225 LBS | HEIGHT: 72 IN

## 2024-02-27 DIAGNOSIS — G47.61 PLMD (PERIODIC LIMB MOVEMENT DISORDER): Primary | ICD-10-CM

## 2024-02-27 PROCEDURE — 99213 OFFICE O/P EST LOW 20 MIN: CPT | Mod: 95 | Performed by: FAMILY MEDICINE

## 2024-02-27 ASSESSMENT — PAIN SCALES - GENERAL: PAINLEVEL: NO PAIN (0)

## 2024-02-27 NOTE — NURSING NOTE
Is the patient currently in the state of [place]? YES    Visit mode:VIDEO    If the visit is dropped, the patient can be reconnected by: VIDEO VISIT: Text to cell phone:   Telephone Information:   Mobile [phone]       Will anyone else be joining the visit? NO  (If patient encounters technical issues they should call [phone]    How would you like to obtain your AVS? MyChart    Are changes needed to the allergy or medication list? Pt stated no changes to allergies and Pt stated no med changes    Reason for visit: RECHECK  Has patient had flu shot for current/most recent flu season? If so, when? No    Aleyda LEOS

## 2024-02-28 LAB
TESTOST FREE SERPL-MCNC: 9.65 NG/DL
TESTOST SERPL-MCNC: 414 NG/DL (ref 240–950)

## 2024-03-24 LAB — SLPCOMP: NORMAL

## 2024-03-28 ENCOUNTER — LAB (OUTPATIENT)
Dept: LAB | Facility: OTHER | Age: 34
End: 2024-03-28
Payer: COMMERCIAL

## 2024-03-28 ENCOUNTER — TELEPHONE (OUTPATIENT)
Dept: FAMILY MEDICINE | Facility: OTHER | Age: 34
End: 2024-03-28

## 2024-03-28 ENCOUNTER — OFFICE VISIT (OUTPATIENT)
Dept: FAMILY MEDICINE | Facility: OTHER | Age: 34
End: 2024-03-28
Payer: COMMERCIAL

## 2024-03-28 VITALS — SYSTOLIC BLOOD PRESSURE: 126 MMHG | DIASTOLIC BLOOD PRESSURE: 88 MMHG | HEART RATE: 89 BPM | OXYGEN SATURATION: 96 %

## 2024-03-28 DIAGNOSIS — G47.61 PLMD (PERIODIC LIMB MOVEMENT DISORDER): ICD-10-CM

## 2024-03-28 DIAGNOSIS — I10 ESSENTIAL HYPERTENSION: Primary | ICD-10-CM

## 2024-03-28 LAB
FERRITIN SERPL-MCNC: 221 NG/ML (ref 31–409)
IRON BINDING CAPACITY (ROCHE): 265 UG/DL (ref 240–430)
IRON SATN MFR SERPL: 43 % (ref 15–46)
IRON SERPL-MCNC: 114 UG/DL (ref 61–157)

## 2024-03-28 PROCEDURE — 83540 ASSAY OF IRON: CPT

## 2024-03-28 PROCEDURE — 82728 ASSAY OF FERRITIN: CPT

## 2024-03-28 PROCEDURE — 36415 COLL VENOUS BLD VENIPUNCTURE: CPT

## 2024-03-28 PROCEDURE — 83550 IRON BINDING TEST: CPT

## 2024-03-28 NOTE — TELEPHONE ENCOUNTER
Patient was here for a blood pressure check today.  Please see readings below.  Thank you.  Martha Mendez, MACKENZIEN    /88     Pulse 89     SpO2 96%

## 2024-04-01 NOTE — PROGRESS NOTES
Noted.  /88.  Continue Chlorthalidone 25 mg daily.  Physical is scheduled for 11/2024.  Would suggest BP check in office with appointment again in 2-3 months.  Please schedule.

## 2024-04-01 NOTE — TELEPHONE ENCOUNTER
Left message for patient, just needs to call back to schedule his 2-3 month blood pressure check.

## 2024-04-01 NOTE — TELEPHONE ENCOUNTER
Noted.   Continue Chlorthalidone 25 mg daily.  Physical already scheduled for 11/2024.  Schedule office visit BP check in 2-3 months please as well.

## 2024-04-06 NOTE — RESULT ENCOUNTER NOTE
Hello,    Your iron levels and iron stores returned in the normal range, and also above the threshold where we would recommend additional iron supplementation from a sleep perspective.    Abhijit Reese MD    Sleep Medicine  [facility]  - [place], [place]  Main Office: [phone]  [facility] [address], [address]  Schedule visits: [phone]  Main Office: [phone]  Fax: [phone]

## 2024-04-21 ENCOUNTER — HOSPITAL ENCOUNTER (EMERGENCY)
Facility: HOSPITAL | Age: 34
Discharge: HOME | End: 2024-04-21
Attending: FAMILY MEDICINE | Admitting: FAMILY MEDICINE
Payer: COMMERCIAL

## 2024-04-21 ENCOUNTER — APPOINTMENT (OUTPATIENT)
Dept: CT IMAGING | Facility: HOSPITAL | Age: 34
End: 2024-04-21
Attending: FAMILY MEDICINE
Payer: COMMERCIAL

## 2024-04-21 VITALS
HEART RATE: 16 BPM | OXYGEN SATURATION: 93 % | HEIGHT: 74 IN | RESPIRATION RATE: 16 BRPM | SYSTOLIC BLOOD PRESSURE: 134 MMHG | TEMPERATURE: 98.5 F | WEIGHT: 224 LBS | BODY MASS INDEX: 28.75 KG/M2 | DIASTOLIC BLOOD PRESSURE: 90 MMHG

## 2024-04-21 DIAGNOSIS — R10.9 ABDOMINAL PAIN, UNSPECIFIED ABDOMINAL LOCATION: ICD-10-CM

## 2024-04-21 DIAGNOSIS — A02.9: ICD-10-CM

## 2024-04-21 LAB
ALBUMIN SERPL BCG-MCNC: 4.3 G/DL (ref 3.5–5.2)
ALP SERPL-CCNC: 91 U/L (ref 40–150)
ALT SERPL W P-5'-P-CCNC: 15 U/L (ref 0–70)
ANION GAP SERPL CALCULATED.3IONS-SCNC: 10 MMOL/L (ref 7–15)
AST SERPL W P-5'-P-CCNC: 26 U/L (ref 0–45)
BASOPHILS # BLD AUTO: 0 10E3/UL (ref 0–0.2)
BASOPHILS NFR BLD AUTO: 1 %
BILIRUB DIRECT SERPL-MCNC: <0.2 MG/DL (ref 0–0.3)
BILIRUB SERPL-MCNC: 0.3 MG/DL
BUN SERPL-MCNC: 14.5 MG/DL (ref 6–20)
CALCIUM SERPL-MCNC: 9.5 MG/DL (ref 8.6–10)
CHLORIDE SERPL-SCNC: 101 MMOL/L (ref 98–107)
CREAT SERPL-MCNC: 0.88 MG/DL (ref 0.67–1.17)
CRP SERPL-MCNC: 3.89 MG/L
DEPRECATED HCO3 PLAS-SCNC: 26 MMOL/L (ref 22–29)
EGFRCR SERPLBLD CKD-EPI 2021: >90 ML/MIN/1.73M2
EOSINOPHIL # BLD AUTO: 0.7 10E3/UL (ref 0–0.7)
EOSINOPHIL NFR BLD AUTO: 10 %
ERYTHROCYTE [DISTWIDTH] IN BLOOD BY AUTOMATED COUNT: 12.6 % (ref 10–15)
GLUCOSE SERPL-MCNC: 117 MG/DL (ref 70–99)
HCT VFR BLD AUTO: 41.5 % (ref 40–53)
HGB BLD-MCNC: 14.9 G/DL (ref 13.3–17.7)
HOLD SPECIMEN: NORMAL
IMM GRANULOCYTES # BLD: 0 10E3/UL
IMM GRANULOCYTES NFR BLD: 0 %
LACTATE SERPL-SCNC: 1.1 MMOL/L (ref 0.7–2)
LIPASE SERPL-CCNC: 68 U/L (ref 13–60)
LYMPHOCYTES # BLD AUTO: 2.5 10E3/UL (ref 0.8–5.3)
LYMPHOCYTES NFR BLD AUTO: 35 %
MCH RBC QN AUTO: 31.7 PG (ref 26.5–33)
MCHC RBC AUTO-ENTMCNC: 35.9 G/DL (ref 31.5–36.5)
MCV RBC AUTO: 88 FL (ref 78–100)
MONOCYTES # BLD AUTO: 0.5 10E3/UL (ref 0–1.3)
MONOCYTES NFR BLD AUTO: 7 %
NEUTROPHILS # BLD AUTO: 3.4 10E3/UL (ref 1.6–8.3)
NEUTROPHILS NFR BLD AUTO: 47 %
NRBC # BLD AUTO: 0 10E3/UL
NRBC BLD AUTO-RTO: 0 /100
PLATELET # BLD AUTO: 233 10E3/UL (ref 150–450)
POTASSIUM SERPL-SCNC: 3.4 MMOL/L (ref 3.4–5.3)
PROT SERPL-MCNC: 7.6 G/DL (ref 6.4–8.3)
RBC # BLD AUTO: 4.7 10E6/UL (ref 4.4–5.9)
SODIUM SERPL-SCNC: 137 MMOL/L (ref 135–145)
WBC # BLD AUTO: 7.1 10E3/UL (ref 4–11)

## 2024-04-21 PROCEDURE — 250N000011 HC RX IP 250 OP 636: Performed by: FAMILY MEDICINE

## 2024-04-21 PROCEDURE — 36415 COLL VENOUS BLD VENIPUNCTURE: CPT | Performed by: FAMILY MEDICINE

## 2024-04-21 PROCEDURE — 96375 TX/PRO/DX INJ NEW DRUG ADDON: CPT

## 2024-04-21 PROCEDURE — 83690 ASSAY OF LIPASE: CPT | Performed by: FAMILY MEDICINE

## 2024-04-21 PROCEDURE — 74177 CT ABD & PELVIS W/CONTRAST: CPT

## 2024-04-21 PROCEDURE — 250N000013 HC RX MED GY IP 250 OP 250 PS 637: Performed by: EMERGENCY MEDICINE

## 2024-04-21 PROCEDURE — 82248 BILIRUBIN DIRECT: CPT | Performed by: FAMILY MEDICINE

## 2024-04-21 PROCEDURE — 96374 THER/PROPH/DIAG INJ IV PUSH: CPT | Mod: XU

## 2024-04-21 PROCEDURE — 86140 C-REACTIVE PROTEIN: CPT | Performed by: FAMILY MEDICINE

## 2024-04-21 PROCEDURE — 85025 COMPLETE CBC W/AUTO DIFF WBC: CPT | Performed by: FAMILY MEDICINE

## 2024-04-21 PROCEDURE — 258N000003 HC RX IP 258 OP 636: Performed by: FAMILY MEDICINE

## 2024-04-21 PROCEDURE — 99285 EMERGENCY DEPT VISIT HI MDM: CPT | Mod: 25

## 2024-04-21 PROCEDURE — 99284 EMERGENCY DEPT VISIT MOD MDM: CPT | Performed by: FAMILY MEDICINE

## 2024-04-21 PROCEDURE — 83605 ASSAY OF LACTIC ACID: CPT | Performed by: FAMILY MEDICINE

## 2024-04-21 PROCEDURE — 96361 HYDRATE IV INFUSION ADD-ON: CPT

## 2024-04-21 PROCEDURE — 80048 BASIC METABOLIC PNL TOTAL CA: CPT | Performed by: FAMILY MEDICINE

## 2024-04-21 PROCEDURE — 96376 TX/PRO/DX INJ SAME DRUG ADON: CPT

## 2024-04-21 RX ORDER — MAGNESIUM HYDROXIDE/ALUMINUM HYDROXICE/SIMETHICONE 120; 1200; 1200 MG/30ML; MG/30ML; MG/30ML
15 SUSPENSION ORAL ONCE
Status: COMPLETED | OUTPATIENT
Start: 2024-04-21 | End: 2024-04-21

## 2024-04-21 RX ORDER — IOPAMIDOL 755 MG/ML
110 INJECTION, SOLUTION INTRAVASCULAR ONCE
Status: COMPLETED | OUTPATIENT
Start: 2024-04-21 | End: 2024-04-21

## 2024-04-21 RX ORDER — ONDANSETRON 4 MG/1
4 TABLET, ORALLY DISINTEGRATING ORAL EVERY 6 HOURS PRN
Qty: 10 TABLET | Refills: 0 | Status: SHIPPED | OUTPATIENT
Start: 2024-04-21 | End: 2024-04-24

## 2024-04-21 RX ORDER — AZITHROMYCIN 250 MG/1
250 TABLET, FILM COATED ORAL DAILY
Qty: 4 TABLET | Refills: 0 | Status: SHIPPED | OUTPATIENT
Start: 2024-04-22 | End: 2024-04-21

## 2024-04-21 RX ORDER — ONDANSETRON 2 MG/ML
4 INJECTION INTRAMUSCULAR; INTRAVENOUS EVERY 30 MIN PRN
Status: DISCONTINUED | OUTPATIENT
Start: 2024-04-21 | End: 2024-04-21 | Stop reason: HOSPADM

## 2024-04-21 RX ORDER — AZITHROMYCIN 250 MG/1
500 TABLET, FILM COATED ORAL ONCE
Status: COMPLETED | OUTPATIENT
Start: 2024-04-21 | End: 2024-04-21

## 2024-04-21 RX ORDER — HYDROMORPHONE HYDROCHLORIDE 1 MG/ML
0.5 INJECTION, SOLUTION INTRAMUSCULAR; INTRAVENOUS; SUBCUTANEOUS EVERY 30 MIN PRN
Status: DISCONTINUED | OUTPATIENT
Start: 2024-04-21 | End: 2024-04-21 | Stop reason: HOSPADM

## 2024-04-21 RX ORDER — LOPERAMIDE HYDROCHLORIDE 2 MG/1
TABLET ORAL
Qty: 20 TABLET | Refills: 0 | Status: SHIPPED | OUTPATIENT
Start: 2024-04-21

## 2024-04-21 RX ORDER — AZITHROMYCIN 250 MG/1
TABLET, FILM COATED ORAL
Qty: 6 TABLET | Refills: 0 | Status: SHIPPED | OUTPATIENT
Start: 2024-04-21 | End: 2024-04-26

## 2024-04-21 RX ADMIN — IOPAMIDOL 110 ML: 755 INJECTION, SOLUTION INTRAVENOUS at 07:43

## 2024-04-21 RX ADMIN — ALUMINUM HYDROXIDE, MAGNESIUM HYDROXIDE, AND DIMETHICONE 15 ML: 200; 20; 200 SUSPENSION ORAL at 09:09

## 2024-04-21 RX ADMIN — HYDROMORPHONE HYDROCHLORIDE 0.5 MG: 1 INJECTION, SOLUTION INTRAMUSCULAR; INTRAVENOUS; SUBCUTANEOUS at 07:55

## 2024-04-21 RX ADMIN — HYDROMORPHONE HYDROCHLORIDE 0.5 MG: 1 INJECTION, SOLUTION INTRAMUSCULAR; INTRAVENOUS; SUBCUTANEOUS at 08:29

## 2024-04-21 RX ADMIN — ONDANSETRON 4 MG: 2 INJECTION INTRAMUSCULAR; INTRAVENOUS at 07:31

## 2024-04-21 RX ADMIN — SODIUM CHLORIDE 1000 ML: 9 INJECTION, SOLUTION INTRAVENOUS at 07:31

## 2024-04-21 ASSESSMENT — ACTIVITIES OF DAILY LIVING (ADL)
ADLS_ACUITY_SCORE: 35

## 2024-04-21 ASSESSMENT — COLUMBIA-SUICIDE SEVERITY RATING SCALE - C-SSRS
1. IN THE PAST MONTH, HAVE YOU WISHED YOU WERE DEAD OR WISHED YOU COULD GO TO SLEEP AND NOT WAKE UP?: NO
6. HAVE YOU EVER DONE ANYTHING, STARTED TO DO ANYTHING, OR PREPARED TO DO ANYTHING TO END YOUR LIFE?: NO
2. HAVE YOU ACTUALLY HAD ANY THOUGHTS OF KILLING YOURSELF IN THE PAST MONTH?: NO

## 2024-04-21 NOTE — ED PROVIDER NOTES
"     Emergency Department Patient Sign-out       Brief HPI:  This is a 34 year old male signed out to me by Dr. Napier .  See initial ED Provider note for details of the presentation.          Significant Events prior to my assuming care: labs, and CT abd/pelvis  (pending radiology interpretation).      Exam:   Patient Vitals for the past 24 hrs:   BP Temp Temp src Pulse Resp SpO2 Height Weight   04/21/24 0800 -- -- -- -- -- 97 % -- --   04/21/24 0735 148/96 -- -- 77 -- 96 % -- --   04/21/24 0619 (!) 147/103 98.5  F (36.9  C) Oral 71 18 98 % 1.88 m (6' 2\") 101.6 kg (224 lb)           ED RESULTS:   Results for orders placed or performed during the hospital encounter of 04/21/24 (from the past 24 hour(s))   [place] Draw     Status: None    Collection Time: 04/21/24  6:54 AM    Narrative    The following orders were created for panel order [place] Draw.  Procedure                               Abnormality         Status                     ---------                               -----------         ------                     Extra Blue Top Tube[[id number]]                              Final result               Extra Red Top Tube[[id number]]                               Final result               Extra Green Top (Lithium...[[id number]]                      Final result               Extra Purple Top Tube[[id number]]                            Final result               Extra Heparinized Syringe[[id number]]                        Final result                 Please view results for these tests on the individual orders.   Extra Blue Top Tube     Status: None    Collection Time: 04/21/24  6:54 AM   Result Value Ref Range    Hold Specimen JIC    Extra Red Top Tube     Status: None    Collection Time: 04/21/24  6:54 AM   Result Value Ref Range    Hold Specimen JIC    Extra Green Top (Lithium Heparin) Tube     Status: None    Collection Time: 04/21/24  6:54 AM   Result Value Ref Range    Hold Specimen JIC    Extra Purple Top Tube "     Status: None    Collection Time: 04/21/24  6:54 AM   Result Value Ref Range    Hold Specimen JIC    Extra Heparinized Syringe     Status: None    Collection Time: 04/21/24  6:54 AM   Result Value Ref Range    Hold Specimen JIC    CBC with platelets differential     Status: None    Collection Time: 04/21/24  6:54 AM    Narrative    The following orders were created for panel order CBC with platelets differential.  Procedure                               Abnormality         Status                     ---------                               -----------         ------                     CBC with platelets and d...[[id number]]                      Final result                 Please view results for these tests on the individual orders.   Basic metabolic panel     Status: Abnormal    Collection Time: 04/21/24  6:54 AM   Result Value Ref Range    Sodium 137 135 - 145 mmol/L    Potassium 3.4 3.4 - 5.3 mmol/L    Chloride 101 98 - 107 mmol/L    Carbon Dioxide (CO2) 26 22 - 29 mmol/L    Anion Gap 10 7 - 15 mmol/L    Urea Nitrogen 14.5 6.0 - 20.0 mg/dL    Creatinine 0.88 0.67 - 1.17 mg/dL    GFR Estimate >90 >60 mL/min/1.73m2    Calcium 9.5 8.6 - 10.0 mg/dL    Glucose 117 (H) 70 - 99 mg/dL   Hepatic function panel     Status: Normal    Collection Time: 04/21/24  6:54 AM   Result Value Ref Range    Protein Total 7.6 6.4 - 8.3 g/dL    Albumin 4.3 3.5 - 5.2 g/dL    Bilirubin Total 0.3 <=1.2 mg/dL    Alkaline Phosphatase 91 40 - 150 U/L    AST 26 0 - 45 U/L    ALT 15 0 - 70 U/L    Bilirubin Direct <0.20 0.00 - 0.30 mg/dL   Lipase     Status: Abnormal    Collection Time: 04/21/24  6:54 AM   Result Value Ref Range    Lipase 68 (H) 13 - 60 U/L   CRP inflammation     Status: Normal    Collection Time: 04/21/24  6:54 AM   Result Value Ref Range    CRP Inflammation 3.89 <5.00 mg/L   CBC with platelets and differential     Status: None    Collection Time: 04/21/24  6:54 AM   Result Value Ref Range    WBC Count 7.1 4.0 - 11.0 10e3/uL     RBC Count 4.70 4.40 - 5.90 10e6/uL    Hemoglobin 14.9 13.3 - 17.7 g/dL    Hematocrit 41.5 40.0 - 53.0 %    MCV 88 78 - 100 fL    MCH 31.7 26.5 - 33.0 pg    MCHC 35.9 31.5 - 36.5 g/dL    RDW 12.6 10.0 - 15.0 %    Platelet Count 233 150 - 450 10e3/uL    % Neutrophils 47 %    % Lymphocytes 35 %    % Monocytes 7 %    % Eosinophils 10 %    % Basophils 1 %    % Immature Granulocytes 0 %    NRBCs per 100 WBC 0 <1 /100    Absolute Neutrophils 3.4 1.6 - 8.3 10e3/uL    Absolute Lymphocytes 2.5 0.8 - 5.3 10e3/uL    Absolute Monocytes 0.5 0.0 - 1.3 10e3/uL    Absolute Eosinophils 0.7 0.0 - 0.7 10e3/uL    Absolute Basophils 0.0 0.0 - 0.2 10e3/uL    Absolute Immature Granulocytes 0.0 <=0.4 10e3/uL    Absolute NRBCs 0.0 10e3/uL   Lactic acid whole blood     Status: Normal    Collection Time: 04/21/24  7:31 AM   Result Value Ref Range    Lactic Acid 1.1 0.7 - 2.0 mmol/L   CT Abdomen Pelvis w Contrast     Status: None    Collection Time: 04/21/24  7:56 AM    Narrative    EXAMINATION: CT ABDOMEN PELVIS W CONTRAST, 4/21/2024 7:56 AM    TECHNIQUE:  Helical CT images from the lung bases through the  symphysis pubis were obtained  with IV contrast. Contrast dose: isovue  370 110ml    COMPARISON: none    HISTORY: abd pain, and diarrhea    FINDINGS:    There is dependent atelectasis at the lung bases.    The liver is free of masses or biliary ductal enlargement. No  calcified gallstones are seen.    The the spleen and pancreas appear normal.    The adrenal glands are normal.    The right and left kidneys are free of masses or hydronephrosis.    The periaortic lymph nodes are normal in caliber.    No intraperitoneal masses or inflammatory changes are noted. The  appendix is normal.    In the pelvis the bladder and rectum appear normal.    The regional skeleton is intact      Impression    IMPRESSION: No intraperitoneal masses or inflammatory changes are  noted.     RAFAL CORDON MD         SYSTEM ID:  [id number]       ED MEDICATIONS:    Medications   sodium chloride 0.9% BOLUS 1,000 mL (1,000 mLs Intravenous $New Bag 4/21/24 0731)   ondansetron (ZOFRAN) injection 4 mg (4 mg Intravenous $Given 4/21/24 0731)   HYDROmorphone (PF) (DILAUDID) injection 0.5 mg (0.5 mg Intravenous $Given 4/21/24 5995)   iopamidol (ISOVUE-370) solution 110 mL (110 mLs Intravenous $Given 4/21/24 0743)   sodium chloride (PF) 0.9% PF flush 50 mL (50 mLs Intravenous $Given 4/21/24 0743)         Impression:    ICD-10-CM    1. Abdominal pain, unspecified abdominal location  R10.9           Plan:    Pending studies include CT abd/pelvis.      CT abd/pelvis negative for acute pathology per radiology    9:14 AM  On reexamination patient does not have peritoneal signs. Pain is largely across the top of his abdomen at this point. Discussed findings with patient and wife. Given potential salmonella exposure and duration of symptoms will start azithromycin. Prescribed imodium and zofran as well for symptom control. Patient requested maalox and this was given. Prescriptions sent and note for work given that patient should be free of diarrhea for 24 hours before returning.      DO Archana Singletary Matthew A, DO  04/21/24 1026

## 2024-04-21 NOTE — Clinical Note
Sonido Stevens was seen and treated in our emergency department on 4/21/2024.  He may return to work on 04/24/2024.  Should be free of diarrhea for 24 hours before returning to work.     If you have any questions or concerns, please don't hesitate to call.      Calvin Magaña, DO

## 2024-04-21 NOTE — ED PROVIDER NOTES
History     Chief Complaint   Patient presents with    Abdominal Pain     HPI  Sonido Stevens is a 34 year old male who presented to the ER with a chief complaint of abdominal pain nausea and diarrhea started Thursday morning.  Pain is constant, waxing and waning, stabbing, 6 out of 10, associated with nausea but no vomiting.  Denies any fever or chills.  Frequent loose stool.  Denies any blood in the urine or stool.  Denies any sick contacts.  He ate some chicken the day before symptoms started on Wednesday and he is not sure if he ate some raw chicken.  Denies any fever or chills.  Denies any recent antibiotic use.  No recent travel.  Otherwise denies any chest pain shortness of breath.  Denies any other concern or complaint.    Allergies:  No Known Allergies    Problem List:    Patient Active Problem List    Diagnosis Date Noted    Essential hypertension 11/06/2023     Priority: Medium    Moderate persistent asthma without complication 06/16/2023     Priority: Medium    ACP (advance care planning) 08/04/2016     Priority: Medium     Advance Care Planning 8/4/2016: ACP Review of Chart / Resources Provided:  Reviewed chart for advance care plan.  Sonido Stevens has no plan or code status on file. Discussed available resources and provided with information. Added by Bri Balderrama                Past Medical History:    Past Medical History:   Diagnosis Date    Hypertension late 2023    Uncomplicated asthma 2021       Past Surgical History:    Past Surgical History:   Procedure Laterality Date    ORTHOPEDIC SURGERY  2003    pin in right leg, Tib fib fracture    wisdom teeth  01/01/2014       Family History:    Family History   Problem Relation Age of Onset    Thyroid Disease Mother     Hypertension Maternal Grandmother     Diabetes No family hx of        Social History:  Marital Status:  Single [1]  Social History     Tobacco Use    Smoking status: Former     Current packs/day: 0.00     Average packs/day: 0.5  "packs/day for 17.2 years (8.6 ttl pk-yrs)     Types: Cigarettes, Dip, chew, snus or snuff     Start date: 2006     Quit date: 2023     Years since quittin.8    Smokeless tobacco: Former     Quit date: 2023    Tobacco comments:     [place] [facility] declined call it quits   Vaping Use    Vaping status: Never Used   Substance Use Topics    Alcohol use: Yes    Drug use: No        Medications:    albuterol (PROAIR HFA/PROVENTIL HFA/VENTOLIN HFA) 108 (90 Base) MCG/ACT inhaler  budesonide-formoterol (SYMBICORT) 160-4.5 MCG/ACT Inhaler  cetirizine (ZYRTEC) 10 MG tablet  chlorthalidone (HYGROTON) 25 MG tablet          Review of Systems   All other systems reviewed and are negative.      Physical Exam   BP: (!) 147/103  Pulse: 71  Temp: 98.5  F (36.9  C)  Resp: 18  Height: 188 cm (6' 2\")  Weight: 101.6 kg (224 lb)  SpO2: 98 %      Physical Exam  Constitutional:       General: He is not in acute distress.     Appearance: He is not ill-appearing, toxic-appearing or diaphoretic.   HENT:      Head: Atraumatic.      Nose: Nose normal. No congestion or rhinorrhea.   Eyes:      General: No scleral icterus.        Left eye: No discharge.      Extraocular Movements: Extraocular movements intact.      Conjunctiva/sclera: Conjunctivae normal.      Pupils: Pupils are equal, round, and reactive to light.   Neck:      Vascular: No carotid bruit.   Cardiovascular:      Rate and Rhythm: Normal rate and regular rhythm.      Heart sounds: Normal heart sounds. No murmur heard.  Pulmonary:      Effort: Pulmonary effort is normal. No respiratory distress.      Breath sounds: Normal breath sounds. No stridor. No wheezing, rhonchi or rales.   Chest:      Chest wall: No tenderness.   Abdominal:      General: Bowel sounds are normal. There is no distension.      Palpations: Abdomen is soft. There is no mass.      Tenderness: There is abdominal tenderness. There is no right CVA tenderness, left CVA tenderness, guarding or " rebound.      Hernia: No hernia is present.      Comments: Diffuse abdominal tenderness no guarding no rebound.   Musculoskeletal:         General: No swelling, tenderness, deformity or signs of injury.      Cervical back: Normal range of motion and neck supple. No rigidity or tenderness.      Right lower leg: No edema.      Left lower leg: No edema.   Lymphadenopathy:      Cervical: No cervical adenopathy.   Skin:     General: Skin is warm.      Coloration: Skin is not jaundiced or pale.      Findings: No bruising, erythema, lesion or rash.   Neurological:      General: No focal deficit present.      Mental Status: He is oriented to person, place, and time. Mental status is at baseline.      Cranial Nerves: No cranial nerve deficit.      Sensory: No sensory deficit.      Motor: No weakness.      Coordination: Coordination normal.      Gait: Gait normal.      Deep Tendon Reflexes: Reflexes normal.   Psychiatric:         Mood and Affect: Mood normal.         ED Course   Patient was seen and examined shortly after arrival.  Stable.  Given 1 L normal saline bolus, 4 mg IV Zofran, 0.5 mg IV Dilaudid.  Lab and imaging has been ordered.  Patient care transferred to Dr. Chen at time shift exchange in stable condition for further management and disposition.     Procedures                  Results for orders placed or performed during the hospital encounter of 04/21/24 (from the past 24 hour(s))   [place] Draw    Narrative    The following orders were created for panel order [place] Draw.  Procedure                               Abnormality         Status                     ---------                               -----------         ------                     Extra Blue Top Tube[[id number]]                              In process                 Extra Red Top Tube[[id number]]                               In process                 Extra Green Top (Lithium...[[id number]]                      In process                  Extra Purple Top Tube[[id number]]                            In process                 Extra Heparinized Syringe[[id number]]                        In process                   Please view results for these tests on the individual orders.   CBC with platelets differential    Narrative    The following orders were created for panel order CBC with platelets differential.  Procedure                               Abnormality         Status                     ---------                               -----------         ------                     CBC with platelets and d...[[id number]]                      Final result                 Please view results for these tests on the individual orders.   CBC with platelets and differential   Result Value Ref Range    WBC Count 7.1 4.0 - 11.0 10e3/uL    RBC Count 4.70 4.40 - 5.90 10e6/uL    Hemoglobin 14.9 13.3 - 17.7 g/dL    Hematocrit 41.5 40.0 - 53.0 %    MCV 88 78 - 100 fL    MCH 31.7 26.5 - 33.0 pg    MCHC 35.9 31.5 - 36.5 g/dL    RDW 12.6 10.0 - 15.0 %    Platelet Count 233 150 - 450 10e3/uL    % Neutrophils 47 %    % Lymphocytes 35 %    % Monocytes 7 %    % Eosinophils 10 %    % Basophils 1 %    % Immature Granulocytes 0 %    NRBCs per 100 WBC 0 <1 /100    Absolute Neutrophils 3.4 1.6 - 8.3 10e3/uL    Absolute Lymphocytes 2.5 0.8 - 5.3 10e3/uL    Absolute Monocytes 0.5 0.0 - 1.3 10e3/uL    Absolute Eosinophils 0.7 0.0 - 0.7 10e3/uL    Absolute Basophils 0.0 0.0 - 0.2 10e3/uL    Absolute Immature Granulocytes 0.0 <=0.4 10e3/uL    Absolute NRBCs 0.0 10e3/uL       Medications   sodium chloride 0.9% BOLUS 1,000 mL (has no administration in time range)   ondansetron (ZOFRAN) injection 4 mg (has no administration in time range)   HYDROmorphone (PF) (DILAUDID) injection 0.5 mg (has no administration in time range)       Assessments & Plan (with Medical Decision Making)     I have reviewed the nursing notes.    I have reviewed the findings, diagnosis, plan and need for follow up  with the patient.        New Prescriptions    No medications on file       Final diagnoses:   Abdominal pain, unspecified abdominal location       4/21/2024   [facility]       Divina Napier MD  04/21/24 0704

## 2025-02-13 ENCOUNTER — OFFICE VISIT (OUTPATIENT)
Dept: FAMILY MEDICINE | Facility: OTHER | Age: 35
End: 2025-02-13
Attending: FAMILY MEDICINE
Payer: COMMERCIAL

## 2025-02-13 VITALS
DIASTOLIC BLOOD PRESSURE: 88 MMHG | BODY MASS INDEX: 31.75 KG/M2 | HEART RATE: 83 BPM | OXYGEN SATURATION: 99 % | TEMPERATURE: 97.9 F | WEIGHT: 247.4 LBS | HEIGHT: 74 IN | SYSTOLIC BLOOD PRESSURE: 130 MMHG

## 2025-02-13 DIAGNOSIS — H05.89 MASS OF ORBIT: Primary | ICD-10-CM

## 2025-02-13 DIAGNOSIS — I10 ESSENTIAL HYPERTENSION: ICD-10-CM

## 2025-02-13 PROBLEM — J45.40 MODERATE PERSISTENT ASTHMA WITHOUT COMPLICATION: Status: RESOLVED | Noted: 2023-06-16 | Resolved: 2025-02-13

## 2025-02-13 RX ORDER — CHLORTHALIDONE 25 MG/1
25 TABLET ORAL DAILY
Qty: 90 TABLET | Refills: 0 | Status: SHIPPED | OUTPATIENT
Start: 2025-02-13

## 2025-02-13 ASSESSMENT — PAIN SCALES - GENERAL: PAINLEVEL_OUTOF10: NO PAIN (0)

## 2025-02-13 ASSESSMENT — ENCOUNTER SYMPTOMS: EYE PAIN: 1

## 2025-02-13 NOTE — PATIENT INSTRUCTIONS
Continue chlorthalidone.  Set alert in phone to remind you to take it consistently.  Refill sent.  Follow up for physical as scheduled in May.    Hep B 3 of 3 given.    CT orbits ordered to further evaluate - radiology will call you to schedule.

## 2025-02-13 NOTE — PROGRESS NOTES
"  Assessment & Plan     Mass of orbit  Left - superior, medial.  Feels like bone not cyst or lipoma.  Contacted radiology Dr Tran to verify CT orbit is correct image.  Ordered.  - CT Orbits wo & w Contrast; Future    Essential hypertension  Stable.  Some issues with compliance due to forgetting.  Reminders would be helpful.  Refill done.  Follow up for physical in May as scheduled - lab due then.  - chlorthalidone (HYGROTON) 25 MG tablet; Take 1 tablet (25 mg) by mouth daily.          BMI  Estimated body mass index is 31.76 kg/m  as calculated from the following:    Height as of this encounter: 1.88 m (6' 2\").    Weight as of this encounter: 112.2 kg (247 lb 6.4 oz).   Weight management plan: Discussed healthy diet and exercise guidelines      See Patient Instructions    No follow-ups on file.    Marisol Head is a 34 year old, presenting for the following health issues:  Eye Problem      Vaccines - flu, covid, hep B- agreeable to hep B 3 of 3    Asthma - not asthmatic - prior symptoms related to covid infection -   No inhaler use.  Seasonal allergies - prn antihistamine.    Eye Problem       Concern -lump inside left eye   Onset: a couple of months since around Nov 2024  Description: bulge/lump inside corner of left eye;did have some issues with vision; happened around 1 time since Novemver   Intensity: none  Progression of Symptoms:  intermittent  Accompanying Signs & Symptoms: unknown   Previous history of similar problem: unknown   Precipitating factors:        Worsened by: none  Alleviating factors:        Improved by: none  Therapies tried and outcome: none    Left - upper - inner - wax and wane?  Slow growth.  No pain.  Can be in field of vision.  Possible double vision at times.    Hypertension Follow-up - lab in April - due  Do you check your blood pressure regularly outside of the clinic? Yes   Are you following a low salt diet? No  Are your blood pressures ever more than 140 on the top number " "(systolic) OR more   than 90 on the bottom number (diastolic), for example 140/90? Yes  Weight is up 20 pounds past year -   Home readings - intermittently - can't recall readings.  Does forget often intermittently - goes well for month at a time.  Took it past 2 days      Review of Systems  Constitutional, HEENT, cardiovascular, pulmonary, gi and gu systems are negative, except as otherwise noted.      Objective    /88 (BP Location: Left arm, Patient Position: Sitting, Cuff Size: Adult Regular)   Pulse 83   Temp 97.9  F (36.6  C) (Tympanic)   Ht 1.88 m (6' 2\")   Wt 112.2 kg (247 lb 6.4 oz)   SpO2 99%   BMI 31.76 kg/m    Body mass index is 31.76 kg/m .  Physical Exam   GENERAL: alert and no distress  EYES: PERRL, EOMI, conjunctivae and sclerae normal, fundi benign-no diabetic or hypertensive changes seen, and left eye- medial superior - along brow where it meets the nasal septum there is a prominence, ailin, without overlying skin changes; mildly tender, impacting vision with medal and superior gaze  RESP: lungs clear to auscultation - no rales, rhonchi or wheezes  CV: regular rate and rhythm, normal S1 S2, no S3 or S4, no murmur, click or rub, no peripheral edema  MS: no gross musculoskeletal defects noted, no edema  PSYCH: mentation appears normal, affect normal/bright            Signed Electronically by: Africa Bass MD    "

## 2025-02-21 ENCOUNTER — HOSPITAL ENCOUNTER (OUTPATIENT)
Dept: CT IMAGING | Facility: HOSPITAL | Age: 35
Discharge: HOME | End: 2025-02-21
Attending: FAMILY MEDICINE | Admitting: FAMILY MEDICINE
Payer: COMMERCIAL

## 2025-02-21 DIAGNOSIS — H05.89 MASS OF ORBIT: ICD-10-CM

## 2025-02-21 PROCEDURE — 70480 CT ORBIT/EAR/FOSSA W/O DYE: CPT

## 2025-02-25 DIAGNOSIS — J34.89 MASS OF SINUS: ICD-10-CM

## 2025-02-25 DIAGNOSIS — R93.0 ABNORMAL CT SCAN, SINUS: Primary | ICD-10-CM

## 2025-02-25 DIAGNOSIS — H05.89 MASS OF ORBIT: ICD-10-CM

## 2025-02-27 ENCOUNTER — MYC MEDICAL ADVICE (OUTPATIENT)
Dept: FAMILY MEDICINE | Facility: OTHER | Age: 35
End: 2025-02-27

## 2025-02-27 ASSESSMENT — ASTHMA QUESTIONNAIRES

## 2025-02-27 NOTE — TELEPHONE ENCOUNTER
Patient completed [facility] ACT questionnaire on 2/27/2025 for [facility] quality patient outreach. Currently meeting goal parameters with an ACT total score >= 20. This writer does not note any major respiratory or safety issues at present. Continue with current plan of care.         1/8/2024     9:08 AM 2/26/2024     8:54 AM 2/27/2025     3:50 PM   ACT Total Scores   ACT TOTAL SCORE (Goal Greater than or Equal to 20) 22 21 25    In the past 12 months, how many times did you visit the emergency room for your asthma without being admitted to the hospital? 0 0 0   In the past 12 months, how many times were you hospitalized overnight because of your asthma? 0 0 0       Patient-reported      Appointments in Next Year      Mar 06, 2025 9:15 AM  (Arrive by 9:00 AM)  New Visit with Mary Jane Evans MD  [facility] ([facility] ) [phone]     May 09, 2025 8:45 AM  (Arrive by 8:30 AM)  Adult Preventative Visit with Africa Ritter MD  [facility] ([facility] ) [phone]          Ashlyn Muñoz RN

## 2025-03-05 NOTE — PATIENT INSTRUCTIONS
[facility] rhinology Dr. Alan Mckeon  Ophthalmology  CT sinus in [place] prior to visit rhinology  We are happy to see you for post op exams per Dr. Mckeon  Start iwona med saline irrigations      Thank you for allowing Dr. Evans and our ENT team to participate in your care.  If your medications are too expensive, please give the nurse a call.  We can possibly change this medication.  If you have a scheduling or an appointment question please contact our Health Unit Coordinator at their direct line [phone].   ALL nursing questions or concerns can be directed to your ENT nurse, Keyur, at: [phone]

## 2025-03-05 NOTE — PROGRESS NOTES
Otolaryngology Consultation    Patient: Sonido Stevens  : 1990    Patient presents with:  Results: Abnormal CT Results/ Mass of Sinus// Africa Ritter MD Referring      HPI:  Sonido Stevens is a 34 year old male seen today for evaluation of chronic sinusitis and a left orbital mass.    He presented to his primary physician Dr. Ritter on 2025 for a left superior medial orbital mass.  Associated change in vision.  He noted the swelling in November.         Had a telephone visit due to COVID pandemic with Dominique on 3/19/2020 for chronic sinusitis.  Pansinusitis noted on CT sinus.  He was started on budesonide irrigations Flonase Augmentin and Zyrtec-D he was to follow-up with me but was lost to follow-up.    SNOT 42 with chronic left upper eye pressure, nasal obstruction, facial pressure, frontal pressure severe during infections.     No recent ophthalmology exam.  In November, he started to note some double vision when left mass enlarged.  No current change in vision.      Rx trials prior budesonide irrigations in distant past, no current irrigations    History of asthma : none, but hx long covid, no current inh steroids  History of aspirin allergy :  none    Allergy history:  no prior skin testing    Prior nasal surgery or nasal trauma :  no    History of migraines:  no    He more recently saw Dr. Ritter for swelling of the left eye.  A CT orbit was ordered dated 2025 which shows chronic pansinusitis and polyposis with focal bone dehiscence along the left frontal ethmoidal recess.  This is associated with a 1.8 cm soft tissue mass.    On coronal CT the anterior table of the left frontal bone is absent with dehiscence measuring 9.7 mm.  There is complete soft tissue opacification of the left frontal anterior and posterior ethmoid maxillary sinus.  The sphenoid has minimal mucoperiosteal thickening.  On the right side there is expansive filling polyposis of the right maxillary sinus right anterior  and posterior ethmoids right frontal and right sphenoid.  The sphenoid sinus has dense osteitis bilaterally.    The optic nerve is not dehiscent the skull base is intact.  There is bone dehiscence of the lamina bilaterally within the anterior ethmoid cavity.      Deviated nasal septum left.  Bilateral inferior turbinate hypertrophy and diffuse polyposis of the inferior middle meatus on the left middle meatus on the right    Works as a      Sino-Nasal Outcome Test (SNOT - 22)    1. Need to Blow Nose: (Patient-Rptd) (P) Moderate  2. Nasal Blockage: (Patient-Rptd) (P) Moderate  3. Sneezing: (Patient-Rptd) (P) Mild or slight  4. Runny Nose: (Patient-Rptd) (P) Very mild  5. Cough: (Patient-Rptd) (P) None  6. Post-nasal discharge: (Patient-Rptd) (P) Mild or slight  7. Thick nasal discharge: (Patient-Rptd) (P) Mild or slight  8. Ear fullness: (Patient-Rptd) (P) Very mild  9. Dizziness: (Patient-Rptd) (P) Very mild  10. Ear Pain: (Patient-Rptd) (P) Very mild  11. Facial pain/pressure: (Patient-Rptd) (P) Moderate  12. Decreased Sense of Smell/Taste: (Patient-Rptd) (P) Mild or slight  13. Difficulty falling asleep: (Patient-Rptd) (P) Moderate  14. Wake up at night: (Patient-Rptd) (P) Mild or slight  15. Lack of a good night's sleep: (Patient-Rptd) (P) Mild or slight  16. Wake up tired: (Patient-Rptd) (P) Mild or slight  17. Fatigue: (Patient-Rptd) (P) Mild or slight  18. Reduced Productivity: (Patient-Rptd) (P) Mild or slight  19. Reduced Concentration: (Patient-Rptd) (P) Mild or slight  20. Frustrated/restless/irritable: (Patient-Rptd) (P) Moderate  21. Sad: (Patient-Rptd) (P) Mild or slight  22. Embarrassed: (Patient-Rptd) (P) Very mild    Total Score: (Patient-Rptd) (P) 42    COPYRIGHT 1996. [facility] [place] [facility]      Current Outpatient Rx   Medication Sig Dispense Refill    cetirizine (ZYRTEC) 10 MG tablet Take 10 mg by mouth daily.      chlorthalidone (HYGROTON) 25 MG tablet Take 1 tablet  "(25 mg) by mouth daily. 90 tablet 0       Allergies: Patient has no known allergies.     Past Medical History:   Diagnosis Date    Hypertension late     Uncomplicated asthma     long covid       Past Surgical History:   Procedure Laterality Date    ORTHOPEDIC SURGERY      pin in right leg, Tib fib fracture    wisdom teeth  2014       ENT family history reviewed    Social History     Tobacco Use    Smoking status: Former     Current packs/day: 0.00     Average packs/day: 0.5 packs/day for 17.2 years (8.6 ttl pk-yrs)     Types: Cigarettes, Dip, chew, snus or snuff     Start date: 2006     Quit date: 2023     Years since quittin.6    Smokeless tobacco: Former     Quit date: 2023    Tobacco comments:     info given on [facility] classes, declined call it quits   Vaping Use    Vaping status: Never Used   Substance Use Topics    Alcohol use: Yes    Drug use: No       Review of Systems  ROS: 10 point ROS neg other than the symptoms noted above in the HPI     Physical Exam  /86 (BP Location: Right arm, Patient Position: Sitting, Cuff Size: Adult Large)   Pulse 76   Temp 98  F (36.7  C) (Tympanic)   Resp 16   Ht 1.88 m (6' 2.02\")   Wt 112.2 kg (247 lb 5.7 oz)   SpO2 98%   BMI 31.74 kg/m    General - The patient is well nourished and well developed, and appears to have good nutritional status.  Alert and oriented to person and place, answers questions and cooperates with examination appropriately.   Head and Face - Normocephalic and atraumatic.  The facial nerve is intact, with strong symmetric movements.  Grade 1 out of 6 bilaterally  Left frontal sinus slightly boggy and full compared with right.  Left superior medial frontoethmoidal rubbery mass, about 2 cm, overlying skin normal  Eyes- extraocular muscles intact no entrapment conjunctiva clear.  Voice and Breathing - The patient was breathing comfortably without the use of accessory muscles. There was no wheezing, stridor, or " stertor.  The patients voice was clear and strong, and had appropriate pitch and quality.  No olaf peripheral digital clubbing or cyanosis   Ears -The external auditory canals are patent, the tympanic membranes are intact without effusion, retraction or mass.  Bony landmarks are intact.  Mouth - Examination of the oral cavity showed pink, healthy oral mucosa. No lesions or ulcerations noted.  The tongue was mobile and midline, and the dentition were in good condition.    Throat - The walls of the oropharynx were smooth, pink, moist, symmetric, and had no lesions or ulcerations.  The tonsillar pillars and soft palate were symmetric.  The uvula was midline on elevation.    Neck - No palpable enlarged fixed cervical lymph nodes.  No neck cysts or unusual tenderness to palpation.   No palpable fixed thyroid nodules or concerning goiter.  The trachea is grossly midline.   Nose - External contour is symmetric, no gross deflection or scars.  Nasal mucosa is pink and moist with no abnormal mucus.  The septum and turbinates were evaluated.  No polyps, masses, or purulence noted on examination.    To evaluate the nose and sinuses, I performed rigid nasal endoscopy.  I applied topical nasal lidocaine and neosynephrine.    I began with the LEFT side using a 0 degree rigid nasal endoscope, and then similarly examined the RIGHT side    Findings:  Diffuse bilateral polyposis of the inferior and middle meatus.  Minimal viscous secretions removed with an 8 Boggs there is not enough to collect for culture.  He does not tolerate visualization of the nasopharynx.  Right septal spur floor.  Inferior turbinates: 3+  Slight oozing left septum controlled with silver nitrate  The patient tolerated the procedure well    Impression and Plan- Sonido Stevens is a 34 year old male with:    ICD-10-CM    1. Mass of sinus  J34.89 Adult ENT  Referral      2. Vision changes  H53.9 Adult Eye  Referral      3. Chronic pansinusitis   J32.4 amoxicillin-clavulanate (AUGMENTIN) 875-125 MG tablet      4. Nasal polyp  J33.9 amoxicillin-clavulanate (AUGMENTIN) 875-125 MG tablet      5. 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 DNS (deviated nasal septum)  J34.2             I have been in contact with Dr. Mckeon, rhinology.  Sonido will most likely require a Draf 3.       [facility] rhinology Dr. Alan Mckeon  Ophthalmology  CT sinus in [place] prior to visit rhinology  We are happy to see you for post op exams per Dr. Linda Mars iwona med saline irrigations  Deferred budesonide irrigations to Dr. Mckeon  Start augmentin x 14 days    Anatomy reviewed, CT reviewed, all questions answered.  Surgery and expected recovery briefly discussed.    Mary Jane Evans D.O.  Otolaryngology/Head and Neck Surgery  Allergy

## 2025-03-06 ENCOUNTER — TELEPHONE (OUTPATIENT)
Dept: OTOLARYNGOLOGY | Facility: CLINIC | Age: 35
End: 2025-03-06

## 2025-03-06 ENCOUNTER — OFFICE VISIT (OUTPATIENT)
Dept: OTOLARYNGOLOGY | Facility: OTHER | Age: 35
End: 2025-03-06
Attending: FAMILY MEDICINE
Payer: COMMERCIAL

## 2025-03-06 VITALS
RESPIRATION RATE: 16 BRPM | OXYGEN SATURATION: 98 % | DIASTOLIC BLOOD PRESSURE: 86 MMHG | SYSTOLIC BLOOD PRESSURE: 130 MMHG | HEART RATE: 76 BPM | BODY MASS INDEX: 31.75 KG/M2 | TEMPERATURE: 98 F | HEIGHT: 74 IN | WEIGHT: 247.36 LBS

## 2025-03-06 DIAGNOSIS — J34.2 DNS (DEVIATED NASAL SEPTUM): ICD-10-CM

## 2025-03-06 DIAGNOSIS — J33.9 NASAL POLYP: ICD-10-CM

## 2025-03-06 DIAGNOSIS — J34.89 MASS OF SINUS: Primary | ICD-10-CM

## 2025-03-06 DIAGNOSIS — J32.4 CHRONIC PANSINUSITIS: ICD-10-CM

## 2025-03-06 DIAGNOSIS — H53.9 VISION CHANGES: ICD-10-CM

## 2025-03-06 DIAGNOSIS — J34.1 FRONTAL MUCOCELE: ICD-10-CM

## 2025-03-06 ASSESSMENT — PAIN SCALES - GENERAL: PAINLEVEL_OUTOF10: NO PAIN (0)

## 2025-03-06 NOTE — LETTER
3/6/2025      Sonido Stevens  [address] [facility] [address]      Dear Colleague,    Thank you for referring your patient, Sonido Stevens, to the [facility]. Please see a copy of my visit note below.    Otolaryngology Consultation    Patient: Sonido Stevens  : 1990    Patient presents with:  Results: Abnormal CT Results/ Mass of Sinus// Africa Ritter MD Referring      HPI:  Sonido Stevens is a 34 year old male seen today for evaluation of chronic sinusitis and a left orbital mass.    He presented to his primary physician Dr. Ritter on 2025 for a left superior medial orbital mass.  Associated change in vision.  He noted the swelling in November.         Had a telephone visit due to COVID pandemic with Dominique on 3/19/2020 for chronic sinusitis.  Pansinusitis noted on CT sinus.  He was started on budesonide irrigations Flonase Augmentin and Zyrtec-D he was to follow-up with me but was lost to follow-up.    SNOT 42 with chronic left upper eye pressure, nasal obstruction, facial pressure, frontal pressure severe during infections.     No recent ophthalmology exam.  In November, he started to note some double vision when left mass enlarged.  No current change in vision.      Rx trials prior budesonide irrigations in distant past, no current irrigations    History of asthma : none, but hx long covid, no current inh steroids  History of aspirin allergy :  none    Allergy history:  no prior skin testing    Prior nasal surgery or nasal trauma :  no    History of migraines:  no    He more recently saw Dr. Ritter for swelling of the left eye.  A CT orbit was ordered dated 2025 which shows chronic pansinusitis and polyposis with focal bone dehiscence along the left frontal ethmoidal recess.  This is associated with a 1.8 cm soft tissue mass.    On coronal CT the anterior table of the left frontal bone is absent with dehiscence measuring 9.7 mm.  There is complete soft tissue  opacification of the left frontal anterior and posterior ethmoid maxillary sinus.  The sphenoid has minimal mucoperiosteal thickening.  On the right side there is expansive filling polyposis of the right maxillary sinus right anterior and posterior ethmoids right frontal and right sphenoid.  The sphenoid sinus has dense osteitis bilaterally.    The optic nerve is not dehiscent the skull base is intact.  There is bone dehiscence of the lamina bilaterally within the anterior ethmoid cavity.      Deviated nasal septum left.  Bilateral inferior turbinate hypertrophy and diffuse polyposis of the inferior middle meatus on the left middle meatus on the right    Works as a [employer]     Sino-Nasal Outcome Test (SNOT - 22)    1. Need to Blow Nose: (Patient-Rptd) (P) Moderate  2. Nasal Blockage: (Patient-Rptd) (P) Moderate  3. Sneezing: (Patient-Rptd) (P) Mild or slight  4. Runny Nose: (Patient-Rptd) (P) Very mild  5. Cough: (Patient-Rptd) (P) None  6. Post-nasal discharge: (Patient-Rptd) (P) Mild or slight  7. Thick nasal discharge: (Patient-Rptd) (P) Mild or slight  8. Ear fullness: (Patient-Rptd) (P) Very mild  9. Dizziness: (Patient-Rptd) (P) Very mild  10. Ear Pain: (Patient-Rptd) (P) Very mild  11. Facial pain/pressure: (Patient-Rptd) (P) Moderate  12. Decreased Sense of Smell/Taste: (Patient-Rptd) (P) Mild or slight  13. Difficulty falling asleep: (Patient-Rptd) (P) Moderate  14. Wake up at night: (Patient-Rptd) (P) Mild or slight  15. Lack of a good night's sleep: (Patient-Rptd) (P) Mild or slight  16. Wake up tired: (Patient-Rptd) (P) Mild or slight  17. Fatigue: (Patient-Rptd) (P) Mild or slight  18. Reduced Productivity: (Patient-Rptd) (P) Mild or slight  19. Reduced Concentration: (Patient-Rptd) (P) Mild or slight  20. Frustrated/restless/irritable: (Patient-Rptd) (P) Moderate  21. Sad: (Patient-Rptd) (P) Mild or slight  22. Embarrassed: (Patient-Rptd) (P) Very mild    Total Score: (Patient-Rptd) (P)  "42    COPYRIGHT . [facility] [place], [place]      Current Outpatient Rx   Medication Sig Dispense Refill     cetirizine (ZYRTEC) 10 MG tablet Take 10 mg by mouth daily.       chlorthalidone (HYGROTON) 25 MG tablet Take 1 tablet (25 mg) by mouth daily. 90 tablet 0       Allergies: Patient has no known allergies.     Past Medical History:   Diagnosis Date     Hypertension late      Uncomplicated asthma     long covid       Past Surgical History:   Procedure Laterality Date     ORTHOPEDIC SURGERY      pin in right leg, Tib fib fracture     wisdom teeth  2014       ENT family history reviewed    Social History     Tobacco Use     Smoking status: Former     Current packs/day: 0.00     Average packs/day: 0.5 packs/day for 17.2 years (8.6 ttl pk-yrs)     Types: Cigarettes, Dip, chew, snus or snuff     Start date: 2006     Quit date: 2023     Years since quittin.6     Smokeless tobacco: Former     Quit date: 2023     Tobacco comments:     info given on [facility] [place], declined call it quits   Vaping Use     Vaping status: Never Used   Substance Use Topics     Alcohol use: Yes     Drug use: No       Review of Systems  ROS: 10 point ROS neg other than the symptoms noted above in the HPI     Physical Exam  /86 (BP Location: Right arm, Patient Position: Sitting, Cuff Size: Adult Large)   Pulse 76   Temp 98  F (36.7  C) (Tympanic)   Resp 16   Ht 1.88 m (6' 2.02\")   Wt 112.2 kg (247 lb 5.7 oz)   SpO2 98%   BMI 31.74 kg/m    General - The patient is well nourished and well developed, and appears to have good nutritional status.  Alert and oriented to person and place, answers questions and cooperates with examination appropriately.   Head and Face - Normocephalic and atraumatic.  The facial nerve is intact, with strong symmetric movements.  Grade 1 out of 6 bilaterally  Left frontal sinus slightly boggy and full compared with right.  Left superior medial " frontoethmoidal rubbery mass, about 2 cm, overlying skin normal  Eyes- extraocular muscles intact no entrapment conjunctiva clear.  Voice and Breathing - The patient was breathing comfortably without the use of accessory muscles. There was no wheezing, stridor, or stertor.  The patients voice was clear and strong, and had appropriate pitch and quality.  No olaf peripheral digital clubbing or cyanosis   Ears -The external auditory canals are patent, the tympanic membranes are intact without effusion, retraction or mass.  Bony landmarks are intact.  Mouth - Examination of the oral cavity showed pink, healthy oral mucosa. No lesions or ulcerations noted.  The tongue was mobile and midline, and the dentition were in good condition.    Throat - The walls of the oropharynx were smooth, pink, moist, symmetric, and had no lesions or ulcerations.  The tonsillar pillars and soft palate were symmetric.  The uvula was midline on elevation.    Neck - No palpable enlarged fixed cervical lymph nodes.  No neck cysts or unusual tenderness to palpation.   No palpable fixed thyroid nodules or concerning goiter.  The trachea is grossly midline.   Nose - External contour is symmetric, no gross deflection or scars.  Nasal mucosa is pink and moist with no abnormal mucus.  The septum and turbinates were evaluated.  No polyps, masses, or purulence noted on examination.    To evaluate the nose and sinuses, I performed rigid nasal endoscopy.  I applied topical nasal lidocaine and neosynephrine.    I began with the LEFT side using a 0 degree rigid nasal endoscope, and then similarly examined the RIGHT side    Findings:  Diffuse bilateral polyposis of the inferior and middle meatus.  Minimal viscous secretions removed with an 8 Boggs there is not enough to collect for culture.  He does not tolerate visualization of the nasopharynx.  Right septal spur floor.  Inferior turbinates: 3+  Slight oozing left septum controlled with silver  nitrate  The patient tolerated the procedure well    Impression and Plan- Sonido Stevens is a 34 year old male with:    ICD-10-CM    1. Mass of sinus  J34.89 Adult ENT  Referral      2. Vision changes  H53.9 Adult Eye  Referral      3. Chronic pansinusitis  J32.4 amoxicillin-clavulanate (AUGMENTIN) 875-125 MG tablet      4. Nasal polyp  J33.9 amoxicillin-clavulanate (AUGMENTIN) 875-125 MG tablet      5. 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 DNS (deviated nasal septum)  J34.2             I have been in contact with Dr. Mckeon, rhinology.  Sonido will most likely require a Draf 3.       [facility] rhinology Dr. Alan Mckeon  Ophthalmology  CT sinus in [place] prior to visit rhinology  We are happy to see you for post op exams per Dr. Mckeon  Start iwona med saline irrigations  Deferred budesonide irrigations to Dr. Mckeon  Start augmentin x 14 days    Anatomy reviewed, CT reviewed, all questions answered.  Surgery and expected recovery briefly discussed.    Mary Jane Evans D.O.  Otolaryngology/Head and Neck Surgery  Allergy        Again, thank you for allowing me to participate in the care of your patient.        Sincerely,        Mary Jane Evans MD    Electronically signed

## 2025-03-06 NOTE — TELEPHONE ENCOUNTER
Patient confirmed scheduled appointment:     Date: 4/2/25  Time: 8AM  Appointment Type: New ENT  Provider: Dr. Alan Mckeon   Location: [facility]  Testing/imaging:   Additional Notes:

## 2025-03-10 ENCOUNTER — TRANSFERRED RECORDS (OUTPATIENT)
Dept: HEALTH INFORMATION MANAGEMENT | Facility: CLINIC | Age: 35
End: 2025-03-10

## 2025-03-13 DIAGNOSIS — R93.89 ABNORMAL CT SCAN: Primary | ICD-10-CM

## 2025-03-13 DIAGNOSIS — H05.89 MASS OF ORBIT: ICD-10-CM

## 2025-03-13 DIAGNOSIS — J32.9 CHRONIC SINUSITIS, UNSPECIFIED LOCATION: ICD-10-CM

## 2025-03-13 DIAGNOSIS — J34.89 MASS OF SINUS: ICD-10-CM

## 2025-03-14 ENCOUNTER — HOSPITAL ENCOUNTER (OUTPATIENT)
Dept: CT IMAGING | Facility: HOSPITAL | Age: 35
Discharge: HOME | End: 2025-03-14
Attending: OTOLARYNGOLOGY | Admitting: OTOLARYNGOLOGY
Payer: COMMERCIAL

## 2025-03-14 DIAGNOSIS — J32.4 CHRONIC PANSINUSITIS: ICD-10-CM

## 2025-03-14 DIAGNOSIS — J34.89 MASS OF SINUS: ICD-10-CM

## 2025-03-14 DIAGNOSIS — J33.9 NASAL POLYP: ICD-10-CM

## 2025-03-14 PROCEDURE — 70486 CT MAXILLOFACIAL W/O DYE: CPT

## 2025-03-28 NOTE — PROGRESS NOTES
[facility]  New Patient Visit      Encounter date:   April 2nd, 2025    Referring Provider:   Africa Ritter MD  [address] [facility] [place] [address]    Chief Complaint: Consult    History of Present Illness:   Sonido Stevens is a 34 year old male who presents for consultation regarding sinus mas.    3/6/25- OV Mary Jane Coronado MD @ [facility]: Patient was seen in office for a sinus mass, vision changes, and chronic pansinusitis. Per that providers note:   I have been in contact with Dr. Mckeon, rhinology.  Sonido will most likely require a Draf 3.   [facility] rhinology Dr. Alan Mckeon  Ophthalmology  CT sinus in [place] prior to visit rhinology  We are happy to see you for post op exams per Dr. Mckeon  Start iwona med saline irrigations  Deferred budesonide irrigations to Dr. Mckeon  Start augmentin x 14 days    4/2/25: Today, the patient reports his symptoms come and go. 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 He does do sinus rinses, but ends up stopping use once he begins to feel better. Has been told he has nasal polyps in the past. Has evidence of bilateral frontal sinus mucoceles on CT scan, had an episode of left sided orbital inflammation due to this which improved with antibiotics and steroids.     Review of systems: A 14-point review of systems has been conducted and was negative for any notable symptoms, except as dictated in the history of present illness.     Medical History:  Past Medical History:   Diagnosis Date    Hypertension late 2023    Uncomplicated asthma 2021    long covid        Surgical History:   Past Surgical History:   Procedure Laterality Date    ORTHOPEDIC SURGERY  2003    pin in right leg, Tib fib fracture    wisdom teeth  01/01/2014        Family History:  Family History   Problem Relation Age of Onset    Thyroid Disease Mother     Hypertension Maternal Grandmother     Diabetes No family hx of          Social History:   Social History     Socioeconomic History    Marital status: Single   Tobacco Use    Smoking status: Former     Current packs/day: 0.00     Average packs/day: 0.5 packs/day for 17.2 years (8.6 ttl pk-yrs)     Types: Cigarettes, Dip, chew, snus or snuff     Start date: 2006     Quit date: 2023     Years since quittin.7    Smokeless tobacco: Former     Quit date: 2023    Tobacco comments:     info given on [facility] classes, declined call it quits   Vaping Use    Vaping status: Never Used   Substance and Sexual Activity    Alcohol use: Yes    Drug use: No    Sexual activity: Yes     Partners: Female     Birth control/protection: Natural Family Planning   Other Topics Concern    Parent/sibling w/ CABG, MI or angioplasty before 65F 55M? No     Service No    Blood Transfusions Yes     Comment: Permits if needed    Caffeine Concern Yes     Comment: Very infrequent    Occupational Exposure No    Hobby Hazards No    Sleep Concern No    Stress Concern No    Weight Concern No    Special Diet No    Back Care No    Exercise Yes    Bike Helmet Yes    Seat Belt Yes     Social Drivers of Health     Financial Resource Strain: Low Risk  (2024)    Financial Resource Strain     Within the past 12 months, have you or your family members you live with been unable to get utilities (heat, electricity) when it was really needed?: No   Food Insecurity: Low Risk  (2024)    Food Insecurity     Within the past 12 months, did you worry that your food would run out before you got money to buy more?: No     Within the past 12 months, did the food you bought just not last and you didn t have money to get more?: No   Transportation Needs: Low Risk  (2024)    Transportation Needs     Within the past 12 months, has lack of transportation kept you from medical appointments, getting your medicines, non-medical meetings or appointments, work, or from getting things that you need?: No   Interpersonal  Safety: Low Risk  (2/13/2025)    Interpersonal Safety     Do you feel physically and emotionally safe where you currently live?: Yes     Within the past 12 months, have you been hit, slapped, kicked or otherwise physically hurt by someone?: No     Within the past 12 months, have you been humiliated or emotionally abused in other ways by your partner or ex-partner?: No   Housing Stability: Low Risk  (1/7/2024)    Housing Stability     Do you have housing? : Yes     Are you worried about losing your housing?: No        Physical Exam:  Vital signs: There were no vitals taken for this visit.   General Appearance: No acute distress, appropriate demeanor, conversant  Eyes: moist conjunctivae; EOMI; pupils symmetric; visual acuity grossly intact; no proptosis  Head: normocephalic; overall symmetric appearance without deformity  Face: overall symmetric without deformity  Ears: Normal appearance of external ear; external meatus normal in appearance  Nose: No external deformity  Oral Cavity/oropharynx: Normal appearance of mucosa  Neck: no palpable lymphadenopathy; thyroid without palpable nodules  Lungs: symmetric chest rise; no wheezing  CV: Good distal perfusion; normal hear rate  Extremities: No deformity  Neurologic Exam: Cranial nerves II-XII are grossly intact; no focal deficit    Procedure Note  Procedure performed: Rigid nasal endoscopy  Indication: To evaluate for sinonasal pathology not visualized on routine anterior rhinoscopy  Anesthesia: 4% topical lidocaine with 0.05% oxymetazoline  Description of procedure: After obtaining consent for the procedure from the patient, the sinonasal cavity was sprayed with topical anesthetic. A rigid 30-degree nasal endoscope was used to first used to visualize the nasal cavity, the turbinates, and the nasopharynx on the left. A second pass was then made to visualize the middle meatus, the superior meatus and sphenoethmoid recess. Finally, the scope was turned 90-degrees and used  to visualize the olfactory cleft and frontal outflow tract. A similar approach was used for the contralateral side. They tolerated the procedure well without difficulty.    Lalito-Keyon Endoscopic Scoring System  Endoscopic observation Right Left   Polyps in middle meatus (0 = absent, 1 = restricted to middle meatus, 2 = Beyond middle meatus) 2 2   Discharge (0 = absent, 1 = thin and clear, 2 = thick, purulent) 1 1   Edema (0 = absent, 1 = mild-moderate, 2 = moderate-severe) 1 1     Crusting (0 = absent, 1 = mild-moderate, 2 = moderate-severe) 0 0     Scarring (0= absent, 1 = mild-moderate, 2 = moderate-severe) 0 0   Total  4     Findings  RT/LT: Grade 3 polyposis. Significant underlying inflammation with thick drainage.     The patient tolerated the procedure well without complication.     Imaging Review:  2/21/25 CT Orbital w/o contrast  IMPRESSION:  Chronic paranasal sinusitis and polyposis with focal osseous  dehiscence along the left frontal ethmoidal recess associated with an  18 mm soft tissue mass. Mass could represent exophytic mucocele, nasal  polyp, other soft tissue mass. Recommend ENT consult, consideration of  tissue sampling.    3/14/25 CT Sinus w/o contrast  IMPRESSION:  Chronic paranasal sinusitis and polyposis with focal osseous  dehiscence along the anterior left frontoethmoidal recess and right  anterior medial orbital wall.   Additional areas of marked osseous thinning of the anteromedial  orbital walls and of the paranasal sinuses.    Assessment/Medical Decision Making:  Sonido Stevens is a 34 year old male who presents for consultation regarding bilateral frontal mucoceles. We reviewed his imaging which indicate he has chronic sinusitis. We had a discussion about chronic sinusitis and associated symptoms. We discussed treatment option and what I recommend is bilateral endoscopic sinus surgery with Draf 3.     I discussed the risks, benefits, and alternatives to endoscopic sinonasal surgery,  including but not limited to: pain, bleeding, infection, CSF leak, orbital injury resulting in vision changes and/or vision loss, septal perforation and/or hematoma, dental hypesthesia, facial numbness, need for continued medical management after surgery, and need for additional procedures, among others. He was allowed to ask questions, which I answered to the best of my ability. After this discussion, surgery was elected.       Plan:  Schedule sinus surgery for bilateral endoscopic sinus surgery with Rebecca.   Will work with him to hopefully do two post-op appointments down here. Will communicate with Dr. Evans about continued follow-ups.  I do think he would be a dupixent candidate possibly but will proceed with surgery and rinses and go form there.     Scribe Disclosure:   By signing my name below, I, Destiny Mercado, attest that this documentation has been prepared under the direction and in the presence of Alan Mckeon MD.  - Electronically Signed: Destiny Mercado 04/02/25     Provider Disclosure:  I agree with above History, Review of Systems, Physical exam and Plan.  I have reviewed the content of the documentation and have edited it as needed. I have personally performed the services documented here and the documentation accurately represents those services and the decisions I have made.      Electronically signed by:    Alan Mckeon MD    [facility]  Rhinology  Endoscopic Skull Base Surgery  [facility]  Department of Otolaryngology - Head & Neck Surgery

## 2025-04-02 ENCOUNTER — PREP FOR PROCEDURE (OUTPATIENT)
Dept: OTOLARYNGOLOGY | Facility: CLINIC | Age: 35
End: 2025-04-02

## 2025-04-02 ENCOUNTER — MYC MEDICAL ADVICE (OUTPATIENT)
Dept: OTOLARYNGOLOGY | Facility: CLINIC | Age: 35
End: 2025-04-02

## 2025-04-02 ENCOUNTER — OFFICE VISIT (OUTPATIENT)
Dept: OTOLARYNGOLOGY | Facility: CLINIC | Age: 35
End: 2025-04-02
Payer: COMMERCIAL

## 2025-04-02 VITALS
OXYGEN SATURATION: 96 % | BODY MASS INDEX: 33.48 KG/M2 | HEART RATE: 87 BPM | WEIGHT: 247.2 LBS | DIASTOLIC BLOOD PRESSURE: 98 MMHG | HEIGHT: 72 IN | SYSTOLIC BLOOD PRESSURE: 137 MMHG

## 2025-04-02 DIAGNOSIS — J33.9 NASAL POLYP: ICD-10-CM

## 2025-04-02 DIAGNOSIS — J32.9 CHRONIC SINUSITIS, UNSPECIFIED LOCATION: ICD-10-CM

## 2025-04-02 DIAGNOSIS — H05.89 MASS OF ORBIT: ICD-10-CM

## 2025-04-02 DIAGNOSIS — J32.4 CHRONIC PANSINUSITIS: Primary | ICD-10-CM

## 2025-04-02 DIAGNOSIS — J34.1 FRONTAL MUCOCELE: Primary | ICD-10-CM

## 2025-04-02 DIAGNOSIS — J34.89 MASS OF SINUS: ICD-10-CM

## 2025-04-02 DIAGNOSIS — R93.89 ABNORMAL CT SCAN: ICD-10-CM

## 2025-04-02 RX ORDER — CEFAZOLIN SODIUM 2 G/50ML
2 SOLUTION INTRAVENOUS SEE ADMIN INSTRUCTIONS
OUTPATIENT
Start: 2025-04-02

## 2025-04-02 RX ORDER — CEFAZOLIN SODIUM 2 G/50ML
2 SOLUTION INTRAVENOUS
OUTPATIENT
Start: 2025-04-02

## 2025-04-02 RX ORDER — DEXAMETHASONE SODIUM PHOSPHATE 4 MG/ML
10 INJECTION, SOLUTION INTRA-ARTICULAR; INTRALESIONAL; INTRAMUSCULAR; INTRAVENOUS; SOFT TISSUE ONCE
OUTPATIENT
Start: 2025-04-02 | End: 2025-04-02

## 2025-04-02 ASSESSMENT — PAIN SCALES - GENERAL: PAINLEVEL_OUTOF10: NO PAIN (0)

## 2025-04-02 NOTE — NURSING NOTE
Chief Complaint   Patient presents with    Consult   Blood pressure (!) 137/98, pulse 87, height 1.829 m (6'), weight 112.1 kg (247 lb 3.2 oz), SpO2 96%. Valerio Shelton, EMT

## 2025-04-02 NOTE — PROGRESS NOTES
Teaching Flowsheet - ENT   Relevant Diagnosis: chronic sinusitis  Teaching Topic:Person(s) involved in teaching: Patient       Motivation Level:  Asks Questions:   Yes  Eager to Learn:   Yes  Cooperative:   Yes  Receptive (willing/able to accept information):   Yes  Comments: Reviewed pre-op H and P,  NPO prior to  surgery,  pre-op scrub (given Hibiclens)  Reviewed post-op  cares , activity and pain.     Patient demonstrates understanding of the following:  Reason for the appointment, diagnosis and treatment plan:   Yes  Knowledge of proper use of medications and conditions for which they are ordered (with special attention to potential side effects or drug interactions):  stop aspirin products 1 week before surgery Yes  Which situations necessitate calling provider and whom to contact:   Yes  Nutritional needs and diet plan:   Yes  Pain management techniques:   Yes  Patient instructed on hand hygiene:  Yes  How and/when to access community resources:   Yes     Infection Prevention:  Patient   demonstrates understanding of the following:  Surgical procedure site care taught Yes  Signs and symptoms of infection taught Yes  Wound care taught Yes  Instructional Materials Used/Given: pre- op booklet,verbal instruction.     Audelia Carrasquillo RN

## 2025-04-02 NOTE — LETTER
4/2/2025       RE: Sonido Stevens  [address]  [place] [address]     Dear Colleague,    Thank you for referring your patient, Sonido Stevens, to the [facility] [place] at [facility]. Please see a copy of my visit note below.      [facility]  New Patient Visit      Encounter date:   April 2nd, 2025    Referring Provider:   Africa Ritter MD  [address] [place] [address]    Chief Complaint: Consult    History of Present Illness:   Sonido Stevens is a 34 year old male who presents for consultation regarding sinus mas.    3/6/25- OV Mary Jane Coronado MD @ [facility]: Patient was seen in office for a sinus mass, vision changes, and chronic pansinusitis. Per that providers note:   I have been in contact with Dr. Mckeon, rhinology.  Sonido will most likely require a Draf 3.   [facility] rhinology Dr. Alan Mckeon  Ophthalmology  CT sinus in [place] prior to visit rhinology  We are happy to see you for post op exams per Dr. Mckeon  Start iwona med saline irrigations  Deferred budesonide irrigations to Dr. Mckeon  Start augmentin x 14 days    4/2/25: Today, the patient reports his symptoms come and go. 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 He does do sinus rinses, but ends up stopping use once he begins to feel better. Has been told he has nasal polyps in the past. Has evidence of bilateral frontal sinus mucoceles on CT scan, had an episode of left sided orbital inflammation due to this which improved with antibiotics and steroids.     Review of systems: A 14-point review of systems has been conducted and was negative for any notable symptoms, except as dictated in the history of present illness.     Medical History:  Past Medical History:   Diagnosis Date     Hypertension late 2023     Uncomplicated asthma 2021    long covid        Surgical History:    Past Surgical History:   Procedure Laterality Date     ORTHOPEDIC SURGERY      pin in right leg, Tib fib fracture     wisdom teeth  2014        Family History:  Family History   Problem Relation Age of Onset     Thyroid Disease Mother      Hypertension Maternal Grandmother      Diabetes No family hx of         Social History:   Social History     Socioeconomic History     Marital status: Single   Tobacco Use     Smoking status: Former     Current packs/day: 0.00     Average packs/day: 0.5 packs/day for 17.2 years (8.6 ttl pk-yrs)     Types: Cigarettes, Dip, chew, snus or snuff     Start date: 2006     Quit date: 2023     Years since quittin.7     Smokeless tobacco: Former     Quit date: 2023     Tobacco comments:     info given on [employer] [facility] classes, declined call it quits   Vaping Use     Vaping status: Never Used   Substance and Sexual Activity     Alcohol use: Yes     Drug use: No     Sexual activity: Yes     Partners: Female     Birth control/protection: Natural Family Planning   Other Topics Concern     Parent/sibling w/ CABG, MI or angioplasty before 65F 55M? No      Service No     Blood Transfusions Yes     Comment: Permits if needed     Caffeine Concern Yes     Comment: Very infrequent     Occupational Exposure No     Hobby Hazards No     Sleep Concern No     Stress Concern No     Weight Concern No     Special Diet No     Back Care No     Exercise Yes     Bike Helmet Yes     Seat Belt Yes     Social Drivers of Health     Financial Resource Strain: Low Risk  (2024)    Financial Resource Strain      Within the past 12 months, have you or your family members you live with been unable to get utilities (heat, electricity) when it was really needed?: No   Food Insecurity: Low Risk  (2024)    Food Insecurity      Within the past 12 months, did you worry that your food would run out before you got money to buy more?: No      Within the past 12 months, did the food you bought  just not last and you didn t have money to get more?: No   Transportation Needs: Low Risk  (1/7/2024)    Transportation Needs      Within the past 12 months, has lack of transportation kept you from medical appointments, getting your medicines, non-medical meetings or appointments, work, or from getting things that you need?: No   Interpersonal Safety: Low Risk  (2/13/2025)    Interpersonal Safety      Do you feel physically and emotionally safe where you currently live?: Yes      Within the past 12 months, have you been hit, slapped, kicked or otherwise physically hurt by someone?: No      Within the past 12 months, have you been humiliated or emotionally abused in other ways by your partner or ex-partner?: No   Housing Stability: Low Risk  (1/7/2024)    Housing Stability      Do you have housing? : Yes      Are you worried about losing your housing?: No        Physical Exam:  Vital signs: There were no vitals taken for this visit.   General Appearance: No acute distress, appropriate demeanor, conversant  Eyes: moist conjunctivae; EOMI; pupils symmetric; visual acuity grossly intact; no proptosis  Head: normocephalic; overall symmetric appearance without deformity  Face: overall symmetric without deformity  Ears: Normal appearance of external ear; external meatus normal in appearance  Nose: No external deformity  Oral Cavity/oropharynx: Normal appearance of mucosa  Neck: no palpable lymphadenopathy; thyroid without palpable nodules  Lungs: symmetric chest rise; no wheezing  CV: Good distal perfusion; normal hear rate  Extremities: No deformity  Neurologic Exam: Cranial nerves II-XII are grossly intact; no focal deficit    Procedure Note  Procedure performed: Rigid nasal endoscopy  Indication: To evaluate for sinonasal pathology not visualized on routine anterior rhinoscopy  Anesthesia: 4% topical lidocaine with 0.05% oxymetazoline  Description of procedure: After obtaining consent for the procedure from the  patient, the sinonasal cavity was sprayed with topical anesthetic. A rigid 30-degree nasal endoscope was used to first used to visualize the nasal cavity, the turbinates, and the nasopharynx on the left. A second pass was then made to visualize the middle meatus, the superior meatus and sphenoethmoid recess. Finally, the scope was turned 90-degrees and used to visualize the olfactory cleft and frontal outflow tract. A similar approach was used for the contralateral side. They tolerated the procedure well without difficulty.    Lalito-Keyon Endoscopic Scoring System  Endoscopic observation Right Left   Polyps in middle meatus (0 = absent, 1 = restricted to middle meatus, 2 = Beyond middle meatus) 2 2   Discharge (0 = absent, 1 = thin and clear, 2 = thick, purulent) 1 1   Edema (0 = absent, 1 = mild-moderate, 2 = moderate-severe) 1 1     Crusting (0 = absent, 1 = mild-moderate, 2 = moderate-severe) 0 0     Scarring (0= absent, 1 = mild-moderate, 2 = moderate-severe) 0 0   Total  4     Findings  RT/LT: Grade 3 polyposis. Significant underlying inflammation with thick drainage.     The patient tolerated the procedure well without complication.     Imaging Review:  2/21/25 CT Orbital w/o contrast  IMPRESSION:  Chronic paranasal sinusitis and polyposis with focal osseous  dehiscence along the left frontal ethmoidal recess associated with an  18 mm soft tissue mass. Mass could represent exophytic mucocele, nasal  polyp, other soft tissue mass. Recommend ENT consult, consideration of  tissue sampling.    3/14/25 CT Sinus w/o contrast  IMPRESSION:  Chronic paranasal sinusitis and polyposis with focal osseous  dehiscence along the anterior left frontoethmoidal recess and right  anterior medial orbital wall.   Additional areas of marked osseous thinning of the anteromedial  orbital walls and of the paranasal sinuses.    Assessment/Medical Decision Making:  Sonido Stevens is a 34 year old male who presents for consultation  regarding bilateral frontal mucoceles. We reviewed his imaging which indicate he has chronic sinusitis. We had a discussion about chronic sinusitis and associated symptoms. We discussed treatment option and what I recommend is bilateral endoscopic sinus surgery with Draf 3.     I discussed the risks, benefits, and alternatives to endoscopic sinonasal surgery, including but not limited to: pain, bleeding, infection, CSF leak, orbital injury resulting in vision changes and/or vision loss, septal perforation and/or hematoma, dental hypesthesia, facial numbness, need for continued medical management after surgery, and need for additional procedures, among others. He was allowed to ask questions, which I answered to the best of my ability. After this discussion, surgery was elected.       Plan:  Schedule sinus surgery for bilateral endoscopic sinus surgery with Rebecca.   Will work with him to hopefully do two post-op appointments down here. Will communicate with Dr. Evans about continued follow-ups.  I do think he would be a dupixent candidate possibly but will proceed with surgery and rinses and go form there.     Scribe Disclosure:   By signing my name below, I, Destiny Mercado, attest that this documentation has been prepared under the direction and in the presence of Alan Mckeon MD.  - Electronically Signed: Destiny Mercado 04/02/25     Provider Disclosure:  I agree with above History, Review of Systems, Physical exam and Plan.  I have reviewed the content of the documentation and have edited it as needed. I have personally performed the services documented here and the documentation accurately represents those services and the decisions I have made.      Electronically signed by:    Alan Mckeon MD    [facility]  Rhinology  Endoscopic Skull Base Surgery  [facility]  Department of Otolaryngology - Head & Neck Surgery          Teaching Flowsheet - ENT   Relevant  Diagnosis: chronic sinusitis  Teaching Topic:Person(s) involved in teaching: Patient       Motivation Level:  Asks Questions:   Yes  Eager to Learn:   Yes  Cooperative:   Yes  Receptive (willing/able to accept information):   Yes  Comments: Reviewed pre-op H and P,  NPO prior to  surgery,  pre-op scrub (given Hibiclens)  Reviewed post-op  cares , activity and pain.     Patient demonstrates understanding of the following:  Reason for the appointment, diagnosis and treatment plan:   Yes  Knowledge of proper use of medications and conditions for which they are ordered (with special attention to potential side effects or drug interactions):  stop aspirin products 1 week before surgery Yes  Which situations necessitate calling provider and whom to contact:   Yes  Nutritional needs and diet plan:   Yes  Pain management techniques:   Yes  Patient instructed on hand hygiene:  Yes  How and/when to access community resources:   Yes     Infection Prevention:  Patient   demonstrates understanding of the following:  Surgical procedure site care taught Yes  Signs and symptoms of infection taught Yes  Wound care taught Yes  Instructional Materials Used/Given: pre- op booklet,verbal instruction.     Audelia Carrasquillo RN      Again, thank you for allowing me to participate in the care of your patient.      Sincerely,    Alan Mckeon MD

## 2025-04-08 ENCOUNTER — TELEPHONE (OUTPATIENT)
Dept: OTOLARYNGOLOGY | Facility: CLINIC | Age: 35
End: 2025-04-08
Payer: COMMERCIAL

## 2025-04-08 NOTE — TELEPHONE ENCOUNTER
Left patient a voicemail to schedule surgery for bilateral maxillary antrostomy, total ethmoidectomy, sphenoidotomy, frontal sinusotomy with Draf 3 drill out with Dr. Mckeon - Left Surgery Scheduling line for callback [phone]    Luz Pedraza on 4/8/2025 at 11:30 AM

## 2025-06-19 ENCOUNTER — MYC MEDICAL ADVICE (OUTPATIENT)
Dept: FAMILY MEDICINE | Facility: OTHER | Age: 35
End: 2025-06-19

## 2025-06-19 NOTE — TELEPHONE ENCOUNTER
Called patient, symptoms started 6/13/25. Symptoms started with low back pain. Also feels like he has a fever off and on, but does not check. Just reports he had the chills.   Reports his urine is dark, cloudy, and smelly. No blood.   No pain with urination, no frequency. Feels like he might even be urinating less than normal. No belly pain.   Wants to come in to see PCP today or tomorrow. Can be flexible with schedule.